# Patient Record
Sex: FEMALE | Race: WHITE | Employment: UNEMPLOYED | URBAN - METROPOLITAN AREA
[De-identification: names, ages, dates, MRNs, and addresses within clinical notes are randomized per-mention and may not be internally consistent; named-entity substitution may affect disease eponyms.]

---

## 2017-03-01 PROBLEM — K21.9 GASTROESOPHAGEAL REFLUX DISEASE, ESOPHAGITIS PRESENCE NOT SPECIFIED: Status: ACTIVE | Noted: 2017-03-01

## 2017-06-14 PROCEDURE — 87591 N.GONORRHOEAE DNA AMP PROB: CPT | Performed by: PHYSICIAN ASSISTANT

## 2017-06-14 PROCEDURE — 87510 GARDNER VAG DNA DIR PROBE: CPT | Performed by: PHYSICIAN ASSISTANT

## 2017-06-14 PROCEDURE — 87660 TRICHOMONAS VAGIN DIR PROBE: CPT | Performed by: PHYSICIAN ASSISTANT

## 2017-06-14 PROCEDURE — 87480 CANDIDA DNA DIR PROBE: CPT | Performed by: PHYSICIAN ASSISTANT

## 2017-06-14 PROCEDURE — 87491 CHLMYD TRACH DNA AMP PROBE: CPT | Performed by: PHYSICIAN ASSISTANT

## 2017-09-14 ENCOUNTER — NURSE TRIAGE (OUTPATIENT)
Dept: FAMILY MEDICINE CLINIC | Facility: CLINIC | Age: 37
End: 2017-09-14

## 2017-09-14 NOTE — TELEPHONE ENCOUNTER
Action Requested: Summary for Provider     []  Critical Lab, Recommendations Needed  [] Need Additional Advice  []   FYI    []   Need Orders  [] Need Medications Sent to Pharmacy  []  Other     SUMMARY: OV CREATED, headache, loss of taste    Pt states for

## 2017-09-18 ENCOUNTER — OFFICE VISIT (OUTPATIENT)
Dept: FAMILY MEDICINE CLINIC | Facility: CLINIC | Age: 37
End: 2017-09-18

## 2017-09-18 VITALS
HEIGHT: 60 IN | SYSTOLIC BLOOD PRESSURE: 140 MMHG | TEMPERATURE: 98 F | BODY MASS INDEX: 31.61 KG/M2 | HEART RATE: 73 BPM | RESPIRATION RATE: 16 BRPM | DIASTOLIC BLOOD PRESSURE: 88 MMHG | WEIGHT: 161 LBS

## 2017-09-18 DIAGNOSIS — K14.6 BURNING TONGUE SYNDROME: Primary | ICD-10-CM

## 2017-09-18 PROCEDURE — 99214 OFFICE O/P EST MOD 30 MIN: CPT | Performed by: FAMILY MEDICINE

## 2017-09-18 PROCEDURE — 99212 OFFICE O/P EST SF 10 MIN: CPT | Performed by: FAMILY MEDICINE

## 2017-09-18 RX ORDER — PREDNISONE 1 MG/1
2.5 TABLET ORAL DAILY
Qty: 7 TABLET | Refills: 0 | Status: SHIPPED | OUTPATIENT
Start: 2017-09-18 | End: 2018-02-05 | Stop reason: ALTCHOICE

## 2017-09-18 NOTE — PROGRESS NOTES
9/18/2017  5:07 PM    Peyman Vázquez is a 44year old female. Chief complaint(s): Patient presents with: Other: Patient present for c/o having only little taste to her tongue. Denies injuries, pain, or swelling.      HPI:     Peyman Vázquez primary co Gastrointestinal: Negative for nausea, vomiting, abdominal pain, diarrhea and constipation. Musculoskeletal: Negative for back pain. Neurological: Negative for seizures and headaches. Psychiatric/Behavioral: Negative for depressed mood.        PHYSI mg total) by mouth daily. RECOMMENDATIONS given include: Please, call our office with any questions or concerns. Notify Dr Louisa Torres or the [address] [place] [address] Scott Garnett if there is a deterioration or worsening of the medical condition.  Also, inform the doctor

## 2018-02-05 ENCOUNTER — OFFICE VISIT (OUTPATIENT)
Dept: FAMILY MEDICINE CLINIC | Facility: CLINIC | Age: 38
End: 2018-02-05

## 2018-02-05 VITALS
TEMPERATURE: 98 F | SYSTOLIC BLOOD PRESSURE: 132 MMHG | WEIGHT: 161 LBS | HEIGHT: 60 IN | BODY MASS INDEX: 31.61 KG/M2 | DIASTOLIC BLOOD PRESSURE: 85 MMHG | HEART RATE: 66 BPM

## 2018-02-05 DIAGNOSIS — R10.11 RUQ ABDOMINAL PAIN: Primary | ICD-10-CM

## 2018-02-05 PROCEDURE — 99212 OFFICE O/P EST SF 10 MIN: CPT | Performed by: FAMILY MEDICINE

## 2018-02-05 PROCEDURE — 99214 OFFICE O/P EST MOD 30 MIN: CPT | Performed by: FAMILY MEDICINE

## 2018-02-05 RX ORDER — OMEPRAZOLE 40 MG/1
40 CAPSULE, DELAYED RELEASE ORAL DAILY
Qty: 30 CAPSULE | Refills: 1 | Status: SHIPPED | OUTPATIENT
Start: 2018-02-05 | End: 2018-03-29

## 2018-02-05 NOTE — PROGRESS NOTES
2/5/2018  10:44 AM    Ke Spencer is a 44year old female. Chief complaint(s): Patient presents with:  Abdominal Pain: X3 days, Sx include pain, bloating, and constipation    HPI:     Ke Spencer primary complaint is regarding as above.      Damaris Negative for cough and wheezing. Cardiovascular: Negative for chest pain and palpitations. Gastrointestinal: Positive for abdominal pain and constipation. Negative for heartburn, nausea, vomiting and diarrhea.    Musculoskeletal: Negative for back pain found. -    ASSESSMENT/PLAN:   Assessment   Ruq abdominal pain  (primary encounter diagnosis)    MEDICATIONS:   Signed Prescriptions Disp Refills    Omeprazole 40 MG Oral Capsule Delayed Release 30 capsule 1      Sig: Take 1 capsule (40 mg total) by mouth

## 2018-02-15 ENCOUNTER — TELEPHONE (OUTPATIENT)
Dept: FAMILY MEDICINE CLINIC | Facility: CLINIC | Age: 38
End: 2018-02-15

## 2018-02-15 NOTE — TELEPHONE ENCOUNTER
Pt is calling to ask if results were received? pls advise. Thank you  She has an appt on 2/22 and wants to know if there in so the day she comes in thier ready.

## 2018-02-20 NOTE — TELEPHONE ENCOUNTER
Phone call made spouse answered, unable to leave message but spouse stated that he would let pt know that her results were in and to keep her appt for 02/22/18.

## 2018-02-22 ENCOUNTER — OFFICE VISIT (OUTPATIENT)
Dept: FAMILY MEDICINE CLINIC | Facility: CLINIC | Age: 38
End: 2018-02-22

## 2018-02-22 VITALS
BODY MASS INDEX: 31.41 KG/M2 | TEMPERATURE: 98 F | HEART RATE: 68 BPM | SYSTOLIC BLOOD PRESSURE: 143 MMHG | HEIGHT: 60 IN | DIASTOLIC BLOOD PRESSURE: 83 MMHG | WEIGHT: 160 LBS

## 2018-02-22 DIAGNOSIS — N20.0 RENAL STONES: ICD-10-CM

## 2018-02-22 DIAGNOSIS — R10.84 GENERALIZED ABDOMINAL PAIN: Primary | ICD-10-CM

## 2018-02-22 PROCEDURE — 99213 OFFICE O/P EST LOW 20 MIN: CPT | Performed by: FAMILY MEDICINE

## 2018-02-22 PROCEDURE — 99212 OFFICE O/P EST SF 10 MIN: CPT | Performed by: FAMILY MEDICINE

## 2018-02-22 RX ORDER — TAMSULOSIN HYDROCHLORIDE 0.4 MG/1
0.4 CAPSULE ORAL DAILY
Qty: 30 CAPSULE | Refills: 1 | Status: SHIPPED | OUTPATIENT
Start: 2018-02-22 | End: 2018-03-24

## 2018-02-22 NOTE — PROGRESS NOTES
2/22/2018  9:53 AM    Estefania Trivedi is a 44year old female. Chief complaint(s): Patient presents with:  Test Results    HPI:     Estefania Trivedi primary complaint is regarding abdominal pains.      Patient 44year old female presents with epigastric a Negative for chest pain and palpitations. Gastrointestinal: Positive for abdominal pain. Negative for nausea, vomiting, diarrhea and constipation. Musculoskeletal: Negative for back pain. Neurological: Negative for seizures and headaches.    Psychiatr prescriptions requested or ordered in this encounter    Imaging & Referrals:  None         Benny Kelsey MD

## 2018-03-28 ENCOUNTER — NURSE TRIAGE (OUTPATIENT)
Dept: OTHER | Age: 38
End: 2018-03-28

## 2018-03-28 NOTE — TELEPHONE ENCOUNTER
Action Requested: Summary for Provider     []  Critical Lab, Recommendations Needed  [] Need Additional Advice  []   FYI    []   Need Orders  [] Need Medications Sent to Pharmacy  []  Other     SUMMARY: Pt seeking recommendations for vaginal itching.     Pt

## 2018-03-29 ENCOUNTER — OFFICE VISIT (OUTPATIENT)
Dept: FAMILY MEDICINE CLINIC | Facility: CLINIC | Age: 38
End: 2018-03-29

## 2018-03-29 VITALS — DIASTOLIC BLOOD PRESSURE: 104 MMHG | SYSTOLIC BLOOD PRESSURE: 155 MMHG | HEART RATE: 65 BPM | HEIGHT: 60 IN

## 2018-03-29 DIAGNOSIS — N89.8 VAGINAL DISCHARGE: Primary | ICD-10-CM

## 2018-03-29 PROCEDURE — 99213 OFFICE O/P EST LOW 20 MIN: CPT | Performed by: NURSE PRACTITIONER

## 2018-03-29 NOTE — TELEPHONE ENCOUNTER
Reviewed Luc Iglesias. APN orders with pt in Serbian language and verbalized understanding. Sent email to Merissa PLAZAto double book appt 3/29/18 @ 2:45pn with Luc Iglesias. APN ADO for vaginal itching.    Please reply to pool: SYLVIA Shaffer

## 2018-03-29 NOTE — TELEPHONE ENCOUNTER
Pt asking if there is anything she can use for the vaginal itching until appointment. Pt states it is unbearable.

## 2018-03-29 NOTE — PROGRESS NOTES
HPI  Pt presents for vaginal d/c-started about 2 weeks ago as yellow d/c with slight odor, now white. Is very itchy-started using vagasil cream yesterday.  Due to scratching perineal area is very red and inflammed-was applying rubbing alcohol  Review of Sy place, and time. Coordination normal.   Skin: Skin is warm and dry. Psychiatric: She has a normal mood and affect. Her behavior is normal. Thought content normal.   Nursing note and vitals reviewed. Assessment:     1.  Vaginal discharge        Plan:

## 2018-03-29 NOTE — TELEPHONE ENCOUNTER
Phoenix Nettles of office. milabent see message below. Please schedule appt. I informed pt on her appt time.

## 2018-03-29 NOTE — PATIENT INSTRUCTIONS
Cómo prevenir la vaginitis     Use un jabón suave, sin perfume, al bañarse o ducharse para no irritar flores vagina. La vaginitis es lily irritación o infección de la vagina o la vulva (la abertura externa de la vagina).  La vaginitis puede deberse a bacter · Baje de peso si es necesario. Si tiene sobrepeso, el aire no puede circular mike alrededor de flores vagina. Eso aumenta flores riesgo de tener infecciones. · Alma Rosa ejercicio con regularidad. La actividad regular le ayudará a estar saludable.   · [place] antibiótico

## 2018-03-30 RX ORDER — METRONIDAZOLE 500 MG/1
500 TABLET ORAL 2 TIMES DAILY
Qty: 14 TABLET | Refills: 0 | Status: SHIPPED | OUTPATIENT
Start: 2018-03-30 | End: 2018-04-06

## 2018-03-31 LAB
GENITAL VAGINOSIS SCREEN: POSITIVE
TRICHOMONAS SCREEN: NEGATIVE

## 2018-04-02 ENCOUNTER — TELEPHONE (OUTPATIENT)
Dept: OTHER | Age: 38
End: 2018-04-02

## 2018-04-02 NOTE — TELEPHONE ENCOUNTER
Notes recorded by NASIR Hernandez on 4/2/2018 at 10:23 AM CDT  LMTCB  ------    Notes recorded by CARLO Samuels, FNP-C on 3/31/2018 at 10:40 AM CDT  Your vaginal culture is negative for trichomonas and yeast.  You should have picked up you

## 2018-04-07 ENCOUNTER — NURSE TRIAGE (OUTPATIENT)
Dept: OTHER | Age: 38
End: 2018-04-07

## 2018-04-07 NOTE — TELEPHONE ENCOUNTER
Action Requested: Summary for Provider     []  Critical Lab, Recommendations Needed  [] Need Additional Advice  []   FYI    []   Need Orders  [] Need Medications Sent to Pharmacy  []  Other     SUMMARY: patient advised Immediate care to further evaluate.  P

## 2018-04-12 ENCOUNTER — OFFICE VISIT (OUTPATIENT)
Dept: FAMILY MEDICINE CLINIC | Facility: CLINIC | Age: 38
End: 2018-04-12

## 2018-04-12 VITALS
WEIGHT: 162 LBS | DIASTOLIC BLOOD PRESSURE: 70 MMHG | SYSTOLIC BLOOD PRESSURE: 120 MMHG | HEIGHT: 60 IN | BODY MASS INDEX: 31.8 KG/M2 | HEART RATE: 72 BPM

## 2018-04-12 DIAGNOSIS — M54.12 CERVICAL RADICULOPATHY: Primary | ICD-10-CM

## 2018-04-12 PROCEDURE — 99212 OFFICE O/P EST SF 10 MIN: CPT | Performed by: FAMILY MEDICINE

## 2018-04-12 PROCEDURE — 99213 OFFICE O/P EST LOW 20 MIN: CPT | Performed by: FAMILY MEDICINE

## 2018-04-12 RX ORDER — TAMSULOSIN HYDROCHLORIDE 0.4 MG/1
CAPSULE ORAL DAILY
COMMUNITY
End: 2018-07-11 | Stop reason: ALTCHOICE

## 2018-04-12 RX ORDER — METHYLPREDNISOLONE 4 MG/1
TABLET ORAL
Qty: 1 KIT | Refills: 1 | Status: SHIPPED | OUTPATIENT
Start: 2018-04-12 | End: 2018-07-11 | Stop reason: ALTCHOICE

## 2018-04-12 NOTE — PROGRESS NOTES
Blood pressure 120/70, pulse 72, height 5' (1.524 m), weight 162 lb (73.5 kg), not currently breastfeeding. Patient presents today complaining of left arm pain from her neck for the past several weeks. She denies trauma.   She works in a [employer]

## 2018-07-10 ENCOUNTER — NURSE TRIAGE (OUTPATIENT)
Dept: OTHER | Age: 38
End: 2018-07-10

## 2018-07-10 NOTE — TELEPHONE ENCOUNTER
Action Requested: Summary for Provider     []  Critical Lab, Recommendations Needed  [] Need Additional Advice  []   FYI    []   Need Orders  [] Need Medications Sent to Pharmacy  []  Other     SUMMARY: Needs appt. Patient call disconnected.  Please give a

## 2018-07-11 ENCOUNTER — OFFICE VISIT (OUTPATIENT)
Dept: FAMILY MEDICINE CLINIC | Facility: CLINIC | Age: 38
End: 2018-07-11

## 2018-07-11 VITALS
TEMPERATURE: 98 F | DIASTOLIC BLOOD PRESSURE: 85 MMHG | SYSTOLIC BLOOD PRESSURE: 135 MMHG | BODY MASS INDEX: 32 KG/M2 | HEART RATE: 63 BPM | WEIGHT: 163 LBS

## 2018-07-11 DIAGNOSIS — N76.0 BACTERIAL VAGINOSIS: Primary | ICD-10-CM

## 2018-07-11 DIAGNOSIS — B96.89 BACTERIAL VAGINOSIS: Primary | ICD-10-CM

## 2018-07-11 DIAGNOSIS — K21.9 GASTROESOPHAGEAL REFLUX DISEASE WITHOUT ESOPHAGITIS: ICD-10-CM

## 2018-07-11 PROCEDURE — 99212 OFFICE O/P EST SF 10 MIN: CPT | Performed by: FAMILY MEDICINE

## 2018-07-11 PROCEDURE — 99214 OFFICE O/P EST MOD 30 MIN: CPT | Performed by: FAMILY MEDICINE

## 2018-07-11 RX ORDER — OMEPRAZOLE 40 MG/1
40 CAPSULE, DELAYED RELEASE ORAL DAILY
Qty: 30 CAPSULE | Refills: 1 | Status: SHIPPED | OUTPATIENT
Start: 2018-07-11 | End: 2019-07-06

## 2018-07-11 RX ORDER — METRONIDAZOLE 7.5 MG/G
1 GEL VAGINAL 2 TIMES DAILY
Qty: 1 TUBE | Refills: 1 | Status: SHIPPED | OUTPATIENT
Start: 2018-07-11 | End: 2018-07-16

## 2018-07-11 NOTE — PROGRESS NOTES
7/11/2018  12:06 PM    Meli Nash is a 44year old female.     Chief complaint(s): Patient presents with:  Vaginal Problem: c/o vaginitis, was seen by Junior Enriquez in March was prescribed Flagyl but she states that she never got a call nor did she take the me 10/13/2012      Medications (Active prior to today's visit):    Current Outpatient Prescriptions:  metRONIDAZOLE 0.75 % Vaginal Gel Place 1 Application vaginally 2 (two) times daily.  Disp: 1 Tube Rfl: 1   Omeprazole 40 MG Oral Capsule Delayed SCREEN   Result Value Ref Range   Genital vaginosis screen Positive (A) Negative   Trichomonas screen Negative Negative   Candida Screen Negative For Candida Negative for Candida       EKG / Spirometry : -     Radiology: No results found. -    ASSESSMENT/P follow-up appointments in  prn. Orders This Visit:  No orders of the defined types were placed in this encounter.       Meds This Visit:    Signed Prescriptions Disp Refills    metRONIDAZOLE 0.75 % Vaginal Gel 1 Tube 1      Sig: Place 1 Application va

## 2018-08-30 ENCOUNTER — OFFICE VISIT (OUTPATIENT)
Dept: FAMILY MEDICINE CLINIC | Facility: CLINIC | Age: 38
End: 2018-08-30

## 2018-08-30 VITALS
TEMPERATURE: 99 F | SYSTOLIC BLOOD PRESSURE: 126 MMHG | HEART RATE: 78 BPM | WEIGHT: 164 LBS | DIASTOLIC BLOOD PRESSURE: 85 MMHG | BODY MASS INDEX: 32 KG/M2

## 2018-08-30 DIAGNOSIS — B34.9 ACUTE VIRAL SYNDROME: Primary | ICD-10-CM

## 2018-08-30 PROCEDURE — 99213 OFFICE O/P EST LOW 20 MIN: CPT | Performed by: FAMILY MEDICINE

## 2018-08-30 PROCEDURE — 99212 OFFICE O/P EST SF 10 MIN: CPT | Performed by: FAMILY MEDICINE

## 2018-08-30 RX ORDER — CODEINE PHOSPHATE AND GUAIFENESIN 10; 100 MG/5ML; MG/5ML
5 SOLUTION ORAL EVERY 6 HOURS PRN
Qty: 120 ML | Refills: 1 | Status: SHIPPED | OUTPATIENT
Start: 2018-08-30 | End: 2018-12-06

## 2018-08-30 RX ORDER — ECHINACEA PURPUREA EXTRACT 125 MG
1 TABLET ORAL 4 TIMES DAILY PRN
Qty: 50 ML | Refills: 1 | Status: SHIPPED | OUTPATIENT
Start: 2018-08-30 | End: 2018-12-06

## 2018-08-30 RX ORDER — IBUPROFEN 600 MG/1
600 TABLET ORAL EVERY 8 HOURS PRN
Qty: 20 TABLET | Refills: 0 | Status: SHIPPED | OUTPATIENT
Start: 2018-08-30 | End: 2018-12-20

## 2018-08-30 NOTE — PROGRESS NOTES
2018 11:38 AM    Katya Amin, : 1980  Patient presents with:  [place]: X 2 days  Body ache and/or chills  Cough      HPI:     Katya Amin is a 43year old female who presents for evaluation of a chief complaint of fever, runny nose, LAPAROSCOPIC CHOLECYSTECTOMY  2013: OTHER SURGICAL HISTORY      Comment: EGD    Social History:     Social History  Social History   Marital status:   Spouse name: N/A    Years of education: N/A  Number of children: N/A     Occupational History  Non Plan:    Diagnosis:    ICD-10-CM    1. Acute viral syndrome B34.9        MEDICATIONS: •  Saline Nasal Spray (OCEAN NASAL SPRAY) 0.65 % Nasal Solution, 1 spray by Nasal route 4 (four) times daily as needed for congestion. , Disp: 50 mL, Rfl: 1  •  Ryan

## 2018-12-05 ENCOUNTER — NURSE TRIAGE (OUTPATIENT)
Dept: OTHER | Age: 38
End: 2018-12-05

## 2018-12-06 ENCOUNTER — TELEPHONE (OUTPATIENT)
Dept: OTHER | Age: 38
End: 2018-12-06

## 2018-12-06 ENCOUNTER — OFFICE VISIT (OUTPATIENT)
Dept: FAMILY MEDICINE CLINIC | Facility: CLINIC | Age: 38
End: 2018-12-06
Payer: COMMERCIAL

## 2018-12-06 VITALS
SYSTOLIC BLOOD PRESSURE: 120 MMHG | HEIGHT: 60 IN | TEMPERATURE: 99 F | BODY MASS INDEX: 32 KG/M2 | DIASTOLIC BLOOD PRESSURE: 84 MMHG | HEART RATE: 65 BPM

## 2018-12-06 DIAGNOSIS — R10.12 LEFT UPPER QUADRANT PAIN: Primary | ICD-10-CM

## 2018-12-06 PROBLEM — N89.8 VAGINAL DISCHARGE: Status: RESOLVED | Noted: 2018-03-29 | Resolved: 2018-12-06

## 2018-12-06 PROCEDURE — 81025 URINE PREGNANCY TEST: CPT | Performed by: PHYSICIAN ASSISTANT

## 2018-12-06 PROCEDURE — 99212 OFFICE O/P EST SF 10 MIN: CPT | Performed by: PHYSICIAN ASSISTANT

## 2018-12-06 PROCEDURE — 99213 OFFICE O/P EST LOW 20 MIN: CPT | Performed by: PHYSICIAN ASSISTANT

## 2018-12-06 NOTE — TELEPHONE ENCOUNTER
Using language line  Angela [id number]:  Patient stated is requesting an earlier appointment. She does not want to wait until the 20 th to see the doctor. Appointment made for today 12/6/18.

## 2018-12-06 NOTE — TELEPHONE ENCOUNTER
Patient calling stating she was advised by CT  that her insurance may not cover the CT because it was not ordered by a medical doctor. Patient requesting CT be ordered by medical doctor.  Patient put the woman in CT scheduling on the line with this

## 2018-12-06 NOTE — ASSESSMENT & PLAN NOTE
Order : CT scan of abdomen with contrast. Advise patient to proceed to ER if worsen. Patient voiced understanding. Patient states that she has no LUQ pain after CT scan scheduled.

## 2018-12-06 NOTE — TELEPHONE ENCOUNTER
Action Requested: Summary for Provider     []  Critical Lab, Recommendations Needed  [] Need Additional Advice  []   FYI    []   Need Orders  [] Need Medications Sent to Pharmacy  []  Other     SUMMARY: OV scheduled with Dr Baljit Tam 12/20/18 for bump under

## 2018-12-06 NOTE — PROGRESS NOTES
HPI:     HPI  43year-old female is here in the office complaining of a lump on LUQ for 2 years. Patient states that the pain is intermittently for 3 days. The pain is at a severity of 8/10 sometimes. Patient has tried Ibuprofen 600 mg with no reief.  Juan Substance and Sexual Activity      Alcohol use: No        Alcohol/week: 0.0 oz      Drug use: No      Sexual activity: Not on file    Other Topics      Concerns:        Not on file    Social History Narrative      Not on file      Review of Systems:   Revi Neurological: She is alert and oriented to person, place, and time. She has normal reflexes. Skin: Skin is intact. No rash noted. Psychiatric: She has a normal mood and affect.        Assessment and Plan[de-identified]     Problem List Items Addressed This Visit

## 2018-12-07 ENCOUNTER — TELEPHONE (OUTPATIENT)
Dept: FAMILY MEDICINE CLINIC | Facility: CLINIC | Age: 38
End: 2018-12-07

## 2018-12-07 NOTE — TELEPHONE ENCOUNTER
Zeferino Canas from [facility] Verification called in stating ramesh Lawrence needs to obtain pt's order authorization STAT for CT of the Abd [address]   Please advise

## 2018-12-20 ENCOUNTER — OFFICE VISIT (OUTPATIENT)
Dept: FAMILY MEDICINE CLINIC | Facility: CLINIC | Age: 38
End: 2018-12-20
Payer: COMMERCIAL

## 2018-12-20 VITALS
HEART RATE: 73 BPM | BODY MASS INDEX: 32 KG/M2 | WEIGHT: 163.63 LBS | TEMPERATURE: 98 F | SYSTOLIC BLOOD PRESSURE: 135 MMHG | DIASTOLIC BLOOD PRESSURE: 92 MMHG

## 2018-12-20 DIAGNOSIS — N20.0 RENAL LITHIASIS: Primary | ICD-10-CM

## 2018-12-20 PROCEDURE — 99213 OFFICE O/P EST LOW 20 MIN: CPT | Performed by: FAMILY MEDICINE

## 2018-12-20 PROCEDURE — 81003 URINALYSIS AUTO W/O SCOPE: CPT | Performed by: FAMILY MEDICINE

## 2018-12-20 PROCEDURE — 99212 OFFICE O/P EST SF 10 MIN: CPT | Performed by: FAMILY MEDICINE

## 2018-12-20 NOTE — PROGRESS NOTES
12/20/2018  11:09 AM    Tanmay Ron is a 43year old female. Chief complaint(s): Patient presents with:  Stones: Patient was seen at [address] [place] [address] for kidney stones    HPI:     Tanmay Ron primary complaint is regarding as above.      Patient 43 year ol Cardiovascular: Negative for chest pain and palpitations. Gastrointestinal: Negative for nausea, vomiting, abdominal pain, diarrhea and constipation. Musculoskeletal: Negative for back pain. Neurological: Negative for seizures and headaches.    Psyc 01/31/2019 Date       EKG / Spirometry : -     Radiology: No results found.      ASSESSMENT/PLAN:   Assessment   Renal lithiasis  (primary encounter diagnosis)    MEDICATIONS:   CPM        LABORATORY & ORDERS: Orders Placed This Encounter      SHARMILA Elias

## 2019-02-14 ENCOUNTER — OFFICE VISIT (OUTPATIENT)
Dept: FAMILY MEDICINE CLINIC | Facility: CLINIC | Age: 39
End: 2019-02-14
Payer: COMMERCIAL

## 2019-02-14 VITALS
TEMPERATURE: 99 F | HEART RATE: 77 BPM | WEIGHT: 168.38 LBS | BODY MASS INDEX: 33.06 KG/M2 | DIASTOLIC BLOOD PRESSURE: 89 MMHG | HEIGHT: 60 IN | SYSTOLIC BLOOD PRESSURE: 164 MMHG

## 2019-02-14 DIAGNOSIS — N20.0 RENAL LITHIASIS: ICD-10-CM

## 2019-02-14 DIAGNOSIS — Z30.017 ENCOUNTER FOR INITIAL PRESCRIPTION OF IMPLANTABLE SUBDERMAL CONTRACEPTIVE: Primary | ICD-10-CM

## 2019-02-14 PROCEDURE — 99212 OFFICE O/P EST SF 10 MIN: CPT | Performed by: FAMILY MEDICINE

## 2019-02-14 PROCEDURE — 99214 OFFICE O/P EST MOD 30 MIN: CPT | Performed by: FAMILY MEDICINE

## 2019-02-14 NOTE — PROGRESS NOTES
2/14/2019  2:02 PM    Jovan Dunlap is a 43year old female. Chief complaint(s): Patient presents with:  Contraception: Patient requesting birthcontrol nexplanon    HPI:     Jovan Dunlap primary complaint is regarding comntraception.      Jacy Lua Procedure Laterality Date   • LAPAROSCOPIC CHOLECYSTECTOMY  8/14   • OTHER SURGICAL HISTORY  2013    EGD      No family history on file.    Social History: Social History    Tobacco Use      Smoking status: Never Smoker      Smokeless tobacco: Never Used She has no cervical adenopathy. Skin: No rash noted. LABORATORY RESULTS:     EKG / Spirometry : -     Radiology: No results found.      ASSESSMENT/PLAN:   Assessment   Encounter for initial prescription of implantable subdermal contraceptive  (prima

## 2019-03-07 ENCOUNTER — TELEPHONE (OUTPATIENT)
Dept: FAMILY MEDICINE CLINIC | Facility: CLINIC | Age: 39
End: 2019-03-07

## 2019-03-07 NOTE — TELEPHONE ENCOUNTER
Pt states that she was seen in the office in February and was told to call back to see if we received the contraceptive that is inserted in the arm so that she can make an appt. Pt would like to know if it has been received. Pt is requesting return call in [facility]. Per pt this is her husbands number and it is ok to leave him a message.

## 2019-04-11 ENCOUNTER — OFFICE VISIT (OUTPATIENT)
Dept: SURGERY | Facility: CLINIC | Age: 39
End: 2019-04-11
Payer: COMMERCIAL

## 2019-04-11 VITALS
BODY MASS INDEX: 33 KG/M2 | DIASTOLIC BLOOD PRESSURE: 85 MMHG | HEART RATE: 71 BPM | TEMPERATURE: 98 F | SYSTOLIC BLOOD PRESSURE: 143 MMHG | WEIGHT: 168 LBS

## 2019-04-11 DIAGNOSIS — N20.0 KIDNEY STONES: Primary | ICD-10-CM

## 2019-04-11 PROCEDURE — 99244 OFF/OP CNSLTJ NEW/EST MOD 40: CPT | Performed by: UROLOGY

## 2019-04-11 PROCEDURE — 99212 OFFICE O/P EST SF 10 MIN: CPT | Performed by: UROLOGY

## 2019-04-11 NOTE — PROGRESS NOTES
SUBJECTIVE:  Primitivo Roach is a 43year old female who presents for a consultation at the request of, and a copy of this note will be sent to, Lee Ann Tenorio, for evaluation of  urinary stone. She states that the problem is unchanged.  Symptoms incl 32.81 kg/m²   She appears well, in no apparent distress. Alert and oriented times three, pleasant and cooperative.   CARDIOVASCULAR:normal apical impulse  RESPIRATORY: no chest wall deformities or tenderness  ABDOMEN: abdomen is soft without significant te

## 2019-04-17 ENCOUNTER — TELEPHONE (OUTPATIENT)
Dept: SURGERY | Facility: CLINIC | Age: 39
End: 2019-04-17

## 2019-04-17 NOTE — TELEPHONE ENCOUNTER
[facility] Insur Verifier calling to inform that prior Fatoumata Barfield is needed for pt. To get CT Abdomen test done on Sat. 4/20/19 -  CPT Code 85652.

## 2019-04-18 NOTE — TELEPHONE ENCOUNTER
[address] [place] [address] CPT code 53646 met criteria, but Reena Galvan location was not the facility insurance approves, was transferred to [employer], [employer] # [id number]. Representative Adrien Clements @ [facility] is approved; [address] [id number] # [id number], fax # [phone]. Fax order to Center for [address] Left message for insurance verification that test is approved, but facility is approved for [facility]. Next, utilized the language to line,  ID # [id number], to leave patient message to call back.

## 2019-04-20 ENCOUNTER — HOSPITAL ENCOUNTER (OUTPATIENT)
Dept: CT IMAGING | Age: 39
Discharge: HOME | End: 2019-04-20
Attending: UROLOGY
Payer: COMMERCIAL

## 2019-04-20 DIAGNOSIS — N20.0 KIDNEY STONES: ICD-10-CM

## 2019-04-20 PROCEDURE — 74176 CT ABD & PELVIS W/O CONTRAST: CPT | Performed by: UROLOGY

## 2019-04-26 NOTE — TELEPHONE ENCOUNTER
Prior authorization for Nexplanon 68MG SC IMPL completed w/[employer] on cover my meds Key: [id number]     Per CMM Available without authorization. I would recommend that pt will have to reach out to ins plan to see what contraceptive is actually covered. Please f/u with patient and notify here. Also, please route to pool Triage pool if further questions. Thank you.   Please respond to pool: EM FM ADO LPN/CMA      FYI Dr Yo Monday

## 2019-04-27 NOTE — TELEPHONE ENCOUNTER
According to [facility] it is available without authorization, if patient has any further questions regarding coverage she may contact her insurance plan.

## 2019-05-02 ENCOUNTER — TELEPHONE (OUTPATIENT)
Dept: SURGERY | Facility: CLINIC | Age: 39
End: 2019-05-02

## 2019-05-02 NOTE — TELEPHONE ENCOUNTER
Pt. Would like to get the results of her CT Scan, and find out plan of treatment. Pt. States that she gives the RN consent to leave her a detailed vm of her results, if she is not able to answer the call. Pt.  Also states that she gives the RN consent to sp

## 2019-05-02 NOTE — TELEPHONE ENCOUNTER
Patient's last office visit = 4/11 for left flank pain, CT stone protocol ordered. Please review.  Thank you

## 2019-05-03 NOTE — TELEPHONE ENCOUNTER
Called and spoke with her  Tena Sen. Patients CT shows bilateral renal calculi more numerous on the right measuring up to 6 mm.   Her scan is suggestive of medullary sponge kidney which I explained predisposes her to an increased risk of forming stones

## 2019-05-06 ENCOUNTER — OFFICE VISIT (OUTPATIENT)
Dept: SURGERY | Facility: CLINIC | Age: 39
End: 2019-05-06
Payer: COMMERCIAL

## 2019-05-06 VITALS
SYSTOLIC BLOOD PRESSURE: 122 MMHG | BODY MASS INDEX: 31.41 KG/M2 | DIASTOLIC BLOOD PRESSURE: 80 MMHG | WEIGHT: 160 LBS | HEIGHT: 60 IN | HEART RATE: 77 BPM | TEMPERATURE: 98 F

## 2019-05-06 DIAGNOSIS — N20.0 KIDNEY STONES: Primary | ICD-10-CM

## 2019-05-06 PROCEDURE — 99212 OFFICE O/P EST SF 10 MIN: CPT | Performed by: UROLOGY

## 2019-05-06 PROCEDURE — 99213 OFFICE O/P EST LOW 20 MIN: CPT | Performed by: UROLOGY

## 2019-05-06 NOTE — PROGRESS NOTES
Koby Garcia is a 43year old female. HPI:   Patient presents with:  Kidney Problem: Patient presents today to review imaging results. Other: Using language line.  Alvin Davila [id number]      28-year-old female in follow-up to a visit April agreed that she would follow-up on a as needed basis. Orders This Visit:  No orders of the defined types were placed in this encounter.       Meds This Visit:  Requested Prescriptions      No prescriptions requested or ordered in this encounter

## 2020-02-07 ENCOUNTER — NURSE TRIAGE (OUTPATIENT)
Dept: FAMILY MEDICINE CLINIC | Facility: CLINIC | Age: 40
End: 2020-02-07

## 2020-02-07 ENCOUNTER — OFFICE VISIT (OUTPATIENT)
Dept: FAMILY MEDICINE CLINIC | Facility: CLINIC | Age: 40
End: 2020-02-07
Payer: COMMERCIAL

## 2020-02-07 VITALS
DIASTOLIC BLOOD PRESSURE: 72 MMHG | HEIGHT: 60 IN | BODY MASS INDEX: 33.06 KG/M2 | TEMPERATURE: 98 F | HEART RATE: 64 BPM | WEIGHT: 168.38 LBS | SYSTOLIC BLOOD PRESSURE: 160 MMHG

## 2020-02-07 DIAGNOSIS — R03.0 BORDERLINE HIGH BLOOD PRESSURE: ICD-10-CM

## 2020-02-07 DIAGNOSIS — M43.6 TORTICOLLIS, ACUTE: Primary | ICD-10-CM

## 2020-02-07 PROCEDURE — 99213 OFFICE O/P EST LOW 20 MIN: CPT | Performed by: FAMILY MEDICINE

## 2020-02-07 RX ORDER — CYCLOBENZAPRINE HCL 10 MG
10 TABLET ORAL 3 TIMES DAILY
Qty: 30 TABLET | Refills: 1 | Status: SHIPPED | OUTPATIENT
Start: 2020-02-07 | End: 2020-02-27

## 2020-02-07 RX ORDER — IBUPROFEN 600 MG/1
600 TABLET ORAL EVERY 6 HOURS PRN
Qty: 60 TABLET | Refills: 0 | Status: SHIPPED | OUTPATIENT
Start: 2020-02-07 | End: 2020-04-13

## 2020-02-07 NOTE — TELEPHONE ENCOUNTER
Action Requested: Summary for Provider     []  Critical Lab, Recommendations Needed  [x] Need Additional Advice  []   FYI    []   Need Orders  [] Need Medications Sent to Pharmacy  []  Other     SUMMARY: Patient is requesting an appointment to see Dr. Amairani Esparza

## 2020-02-07 NOTE — PROGRESS NOTES
2/7/2020  10:17 AM    Carter Valentine Kayla is a 35year old female. Chief complaint(s): Patient presents with:  Neck Pain: right side neck pain x 1 week     HPI:     Carter Garza primary complaint is regarding as above.      Saad Rivera Constitutional: Negative for chills, fatigue and fever. Respiratory: Negative for shortness of breath. Cardiovascular: Negative for chest pain and palpitations. Musculoskeletal: Negative for back pain. Neurological: Negative for headaches.    [facility] if there is a deterioration or worsening of the medical condition. Also, inform the doctor with any new symptoms or medications' side effects. Low salt diet, weight loss. FOLLOW-UP: Schedule a follow-up visit in 1 week.          Orders This Visit

## 2020-02-07 NOTE — TELEPHONE ENCOUNTER
Per Dr. Levy Ser patient is to come to the office now to be evaluated. Patient made aware and voiced understanding and an appointment was scheduled for today 2/7/20.

## 2020-04-02 ENCOUNTER — NURSE TRIAGE (OUTPATIENT)
Dept: FAMILY MEDICINE CLINIC | Facility: CLINIC | Age: 40
End: 2020-04-02

## 2020-04-02 NOTE — TELEPHONE ENCOUNTER
With  [place] ID # [id number]: Have spoken w pt's  and states pt can be reached at other number [phone]. Spoke to pt and scheduled appointment 4/4/20 8 am with Dr. Brenda Mon in [address] [place] [address].

## 2020-04-02 NOTE — TELEPHONE ENCOUNTER
Action Requested: Summary for Provider     []  Critical Lab, Recommendations Needed  [x] Need Additional Advice  []   FYI    []   Need Orders  [] Need Medications Sent to Pharmacy  []  Other     SUMMARY: Patient requesting treatment recommendations for sym

## 2020-04-11 ENCOUNTER — TELEPHONE (OUTPATIENT)
Dept: FAMILY MEDICINE CLINIC | Facility: CLINIC | Age: 40
End: 2020-04-11

## 2020-04-11 NOTE — TELEPHONE ENCOUNTER
Phone call made spouse answered and was suppose to pass phone to patient but then call got d/c. Attempted to call a second time after a few mins no answer. Dary Vazquez may schedule an appt with him today if possible.

## 2020-04-11 NOTE — TELEPHONE ENCOUNTER
Called with the assistance of language line solutions (#). [phone]  Contacted patient to ask her if she would schedule an appointment with Dr Felipe Augustin today  She does not have transportation today and made an appt for Monday 4/13 at 1800

## 2020-04-11 NOTE — TELEPHONE ENCOUNTER
Dr Nevin Marcum for Dr Keyanna Mcrae, please advise. Patient called, asking to schedule OV with Dr Keyanna Mcrae. She has a headache, which she's had before, and was told she had elevated blood pressure at the time.  She had not been diagnosed with hypertension, and hunt[place]

## 2020-04-11 NOTE — TELEPHONE ENCOUNTER
Her blood pressure systolic was 954 in February. I am still working now. If she wants to come in right now we would be happy to see her. Dr. Gallito Burnett will be out next week. I do not think she should wait with the blood pressure that high.

## 2020-04-13 ENCOUNTER — OFFICE VISIT (OUTPATIENT)
Dept: FAMILY MEDICINE CLINIC | Facility: CLINIC | Age: 40
End: 2020-04-13
Payer: COMMERCIAL

## 2020-04-13 VITALS
HEART RATE: 76 BPM | BODY MASS INDEX: 33.77 KG/M2 | TEMPERATURE: 99 F | WEIGHT: 172 LBS | DIASTOLIC BLOOD PRESSURE: 98 MMHG | HEIGHT: 60 IN | SYSTOLIC BLOOD PRESSURE: 158 MMHG

## 2020-04-13 DIAGNOSIS — N20.0 RENAL LITHIASIS: ICD-10-CM

## 2020-04-13 DIAGNOSIS — I10 ESSENTIAL HYPERTENSION: ICD-10-CM

## 2020-04-13 DIAGNOSIS — R51.9 FRONTAL HEADACHE: Primary | ICD-10-CM

## 2020-04-13 DIAGNOSIS — Q61.5 MEDULLARY SPONGE KIDNEY OF BOTH KIDNEYS: ICD-10-CM

## 2020-04-13 PROCEDURE — 99215 OFFICE O/P EST HI 40 MIN: CPT | Performed by: FAMILY MEDICINE

## 2020-04-13 NOTE — PROGRESS NOTES
Patient ID: Doy Lombard is a 35year old female.     HPI  Patient presents with:  Blood Pressure: follow up      Sohan Brown RN   Registered Nurse      Telephone Encounter   Signed   Encounter Date:  4/11/2020               Signed Sharp pain in the forehead. He can last seconds or minutes. It comes and goes. It does not happen every day. She does states she has been quite stressed due to this cold cough with pandemic.   She has been working a little more than usual.  She however Cardiovascular: Negative for chest pain, palpitations and leg swelling. Gastrointestinal: Negative for nausea and vomiting. Neurological: Positive for headaches. Negative for light-headedness.            Medical History:      Past Medical History:   Melissa Abdominal: Normal appearance and bowel sounds are normal. There is    no tenderness. No abdominal bruits  There is no rigidity, no rebound, no guarding. Lymphadenopathy:     Patient has  no cervical adenopathy.    Neurological: Patient is alert and orient -     US KIDNEYS ([id number]); Future    Renal lithiasis  She has already seen urology for this in the past.  These have not been an issue. She is not having any flank pain. She is not passing any stones.       Referrals (if applicable)  Orders Placed This

## 2020-04-14 ENCOUNTER — LAB ENCOUNTER (OUTPATIENT)
Dept: LAB | Age: 40
End: 2020-04-14
Attending: FAMILY MEDICINE
Payer: COMMERCIAL

## 2020-04-14 DIAGNOSIS — Q61.5 MEDULLARY SPONGE KIDNEY OF BOTH KIDNEYS: ICD-10-CM

## 2020-04-14 DIAGNOSIS — I10 ESSENTIAL HYPERTENSION: ICD-10-CM

## 2020-04-14 PROCEDURE — 93005 ELECTROCARDIOGRAM TRACING: CPT

## 2020-04-14 PROCEDURE — 80053 COMPREHEN METABOLIC PANEL: CPT

## 2020-04-14 PROCEDURE — 36415 COLL VENOUS BLD VENIPUNCTURE: CPT

## 2020-04-14 PROCEDURE — 82088 ASSAY OF ALDOSTERONE: CPT

## 2020-04-14 PROCEDURE — 93010 ELECTROCARDIOGRAM REPORT: CPT | Performed by: FAMILY MEDICINE

## 2020-04-14 PROCEDURE — 84443 ASSAY THYROID STIM HORMONE: CPT

## 2020-04-14 PROCEDURE — 80061 LIPID PANEL: CPT

## 2020-04-14 PROCEDURE — 83970 ASSAY OF PARATHORMONE: CPT

## 2020-04-14 PROCEDURE — 81003 URINALYSIS AUTO W/O SCOPE: CPT

## 2020-04-14 PROCEDURE — 85025 COMPLETE CBC W/AUTO DIFF WBC: CPT

## 2020-04-15 ENCOUNTER — TELEPHONE (OUTPATIENT)
Dept: FAMILY MEDICINE CLINIC | Facility: CLINIC | Age: 40
End: 2020-04-15

## 2020-04-15 ENCOUNTER — TELEPHONE (OUTPATIENT)
Dept: OTHER | Age: 40
End: 2020-04-15

## 2020-04-15 NOTE — TELEPHONE ENCOUNTER
With  Xu Darnell id #[id number]   Advised patient of Dr Oumou Kidd note. Patient verbalized understanding.    Will call for ultrasound of kidney and for appointment with Dr. Alejandro Maguire   Informed Amlodipine 5 mg sent to [address] [place] [address] in [facility]

## 2020-04-15 NOTE — TELEPHONE ENCOUNTER
Patient states having a difficult time reaching office to schedule an appointment with Dr. Mian Abraham   Sending note to Nephrology staff to please reach out to patient and help schedule an appointment. Thank you so much  [facility] Nurse Triage.

## 2020-04-15 NOTE — TELEPHONE ENCOUNTER
LMTCB at mobile # and then with male who answered # listed as home # and assured this RN he will contact pt to call back today. Notes recorded by Anni Yoon RN on 4/14/2020 at 3:51 PM CDT  Language line #[phone] assisted with the phone call.  Left mes

## 2020-04-16 NOTE — TELEPHONE ENCOUNTER
Please schedule a video visit for the patient on Monday 4/20 and walk patient thru the video visit requirement including signing into my chart

## 2020-04-17 NOTE — TELEPHONE ENCOUNTER
Patient called using Amharic interpretor Chantal Purdy [phone]. Patient was not available but message was left with  about scheduling a visit Monday 4/20/2020 at 10 am.  Patient does not have My Chart. Patient will call back to confirm if [facility].

## 2020-04-21 NOTE — PROGRESS NOTES
Called with the assistance of language line solutions (#).  Tiffanie Jackson [phone]  LMTCB please transfer to triage RN

## 2020-04-22 NOTE — PROGRESS NOTES
With Language Line [phone], left messages to call back to both cell and home number. No MyChart. No PHONE RESPONSE LETTER mail today including the EKG results.

## 2020-04-23 ENCOUNTER — NURSE TRIAGE (OUTPATIENT)
Dept: FAMILY MEDICINE CLINIC | Facility: CLINIC | Age: 40
End: 2020-04-23

## 2020-04-23 NOTE — TELEPHONE ENCOUNTER
As long as no fevers, cough. Make sure she wears a mask anyway just for protection like she did last time. Inform her that I also enjoyed seeing her as a patient and was glad that we may have found a reason for her high blood pressure.

## 2020-04-23 NOTE — TELEPHONE ENCOUNTER
Action Requested: Summary for Provider     []  Critical Lab, Recommendations Needed  [] Need Additional Advice  []   FYI    []   Need Orders  [] Need Medications Sent to Pharmacy  []  Other     SUMMARY: Dr. Arya Peck,   Patient would like to see you at office

## 2020-04-24 ENCOUNTER — OFFICE VISIT (OUTPATIENT)
Dept: FAMILY MEDICINE CLINIC | Facility: CLINIC | Age: 40
End: 2020-04-24
Payer: COMMERCIAL

## 2020-04-24 ENCOUNTER — LAB ENCOUNTER (OUTPATIENT)
Dept: LAB | Age: 40
End: 2020-04-24
Attending: FAMILY MEDICINE
Payer: COMMERCIAL

## 2020-04-24 VITALS
HEIGHT: 60 IN | BODY MASS INDEX: 33.18 KG/M2 | SYSTOLIC BLOOD PRESSURE: 152 MMHG | TEMPERATURE: 98 F | DIASTOLIC BLOOD PRESSURE: 97 MMHG | WEIGHT: 169 LBS | HEART RATE: 66 BPM

## 2020-04-24 DIAGNOSIS — R19.7 DIARRHEA, UNSPECIFIED TYPE: Primary | ICD-10-CM

## 2020-04-24 DIAGNOSIS — R19.7 DIARRHEA, UNSPECIFIED TYPE: ICD-10-CM

## 2020-04-24 DIAGNOSIS — R01.1 NEWLY RECOGNIZED HEART MURMUR: ICD-10-CM

## 2020-04-24 DIAGNOSIS — Q61.5 MEDULLARY SPONGE KIDNEY OF BOTH KIDNEYS: ICD-10-CM

## 2020-04-24 DIAGNOSIS — I10 ESSENTIAL HYPERTENSION: ICD-10-CM

## 2020-04-24 PROCEDURE — 99214 OFFICE O/P EST MOD 30 MIN: CPT | Performed by: FAMILY MEDICINE

## 2020-04-24 PROCEDURE — 80048 BASIC METABOLIC PNL TOTAL CA: CPT

## 2020-04-24 PROCEDURE — 85025 COMPLETE CBC W/AUTO DIFF WBC: CPT

## 2020-04-24 PROCEDURE — 36415 COLL VENOUS BLD VENIPUNCTURE: CPT

## 2020-04-24 NOTE — PROGRESS NOTES
Patient ID: Mian Orta is a 35year old female.     HPI  Patient presents with:  Diarrhea: x 7 days  Stomach Pain: x 7 days    Telephone message from 4/23/20:  Victoriano Pinto RN   Registered Nurse      Telephone Encounter   Signed   Encounter nephrologist is a office visit or telephone video visit and then the ultrasound of the kidneys is something different.       She has been holding off on taking the Norvasc for 3 days as she thought that was causing the diarrhea but even without the Norvasc tablet (5 mg total) by mouth daily. 90 tablet 0     Allergies:No Known Allergies     Physical Exam:       Physical Exam   Physical Exam   Constitutional: Patient is oriented to person, place, and time. Patient appears well-developed and well-nourished.  No high.    ========================================================================    Start a BRAT diet which can help with diarrhea: Bananas, rice, applesauce and toast. Also can try Pedialyte or Gatorade as needed to stay hydrated.   Try 1 Imodium A.D.  in

## 2020-04-24 NOTE — TELEPHONE ENCOUNTER
Patient has scheduled appt with Dr. Monique Yi for today, 04/24/20, at 9:10 AM in [facility] office

## 2020-04-24 NOTE — PATIENT INSTRUCTIONS
Restart the Norvasc as blood pressure is too high.    ========================================================================    Start a BRAT diet which can help with diarrhea: Bananas, rice, applesauce and toast. Also can try Pedialyte or Gatorade as nee

## 2020-05-05 NOTE — TELEPHONE ENCOUNTER
AARTI to Dr. Daryl Sanders. Left another message with a patient to set up an appointment with Dr. Laverne Cabezas. Pt did not return our last attempt.

## 2020-05-05 NOTE — TELEPHONE ENCOUNTER
To my medical assistants, did she get the kidney ultrasound scheduled, the echocardiogram of her heart scheduled the telephone visit with the nephrologist scheduled? We gave her numbers for all 3 of these the last time she saw me. If she is having any questions or concerns then just set her up for a office visit with me Thursday or Friday of this week so we can discuss further.

## 2020-07-15 ENCOUNTER — TELEPHONE (OUTPATIENT)
Dept: FAMILY MEDICINE CLINIC | Facility: CLINIC | Age: 40
End: 2020-07-15

## 2020-07-15 DIAGNOSIS — I10 ESSENTIAL HYPERTENSION: ICD-10-CM

## 2020-07-15 RX ORDER — AMLODIPINE BESYLATE 5 MG/1
5 TABLET ORAL DAILY
Qty: 90 TABLET | Refills: 0 | Status: SHIPPED | OUTPATIENT
Start: 2020-07-15 | End: 2020-11-02

## 2020-07-15 NOTE — TELEPHONE ENCOUNTER
Current Outpatient Medications:   •  amLODIPine Besylate 5 MG Oral Tab, Take 1 tablet (5 mg total) by mouth daily. , Disp: 90 tablet, Rfl: 0

## 2020-07-28 NOTE — TELEPHONE ENCOUNTER
Epic chart reviewed. Patient has not had testing done. She should schedule a follow up with Dr. Tatianna Vitale. Please assist patient with an appointment .   [phone]

## 2020-07-29 NOTE — TELEPHONE ENCOUNTER
Called patient via language line Franchesca Hu; and she said just for a couple visit with Dr Dereje Aguayo she received a lot of bills already and she can not afford to do the testing right now, and she will call back to make the appointment,.

## 2020-11-02 DIAGNOSIS — I10 ESSENTIAL HYPERTENSION: ICD-10-CM

## 2020-11-02 RX ORDER — AMLODIPINE BESYLATE 5 MG/1
TABLET ORAL
Qty: 90 TABLET | Refills: 0 | Status: SHIPPED | OUTPATIENT
Start: 2020-11-02 | End: 2021-09-23

## 2021-02-03 DIAGNOSIS — I10 ESSENTIAL HYPERTENSION: ICD-10-CM

## 2021-02-09 RX ORDER — AMLODIPINE BESYLATE 5 MG/1
TABLET ORAL
Qty: 90 TABLET | Refills: 0 | OUTPATIENT
Start: 2021-02-09

## 2021-04-26 ENCOUNTER — TELEPHONE (OUTPATIENT)
Dept: FAMILY MEDICINE CLINIC | Facility: CLINIC | Age: 41
End: 2021-04-26

## 2021-04-26 NOTE — TELEPHONE ENCOUNTER
With  ID #[id number], Klever Harper. Patient states she was hospitalized in November for headaches and was told to follow up with her PCP. Patient states she does not have any symptoms, no headaches, only wants appointment with Alexander Bahena.

## 2021-05-14 ENCOUNTER — OFFICE VISIT (OUTPATIENT)
Dept: FAMILY MEDICINE CLINIC | Facility: CLINIC | Age: 41
End: 2021-05-14
Payer: COMMERCIAL

## 2021-05-14 VITALS
BODY MASS INDEX: 32.59 KG/M2 | HEIGHT: 60 IN | WEIGHT: 166 LBS | SYSTOLIC BLOOD PRESSURE: 159 MMHG | DIASTOLIC BLOOD PRESSURE: 93 MMHG | HEART RATE: 73 BPM

## 2021-05-14 DIAGNOSIS — G93.0 ARACHNOID CYST: Primary | ICD-10-CM

## 2021-05-14 DIAGNOSIS — I10 ESSENTIAL HYPERTENSION: ICD-10-CM

## 2021-05-14 PROCEDURE — 3077F SYST BP >= 140 MM HG: CPT | Performed by: FAMILY MEDICINE

## 2021-05-14 PROCEDURE — 3080F DIAST BP >= 90 MM HG: CPT | Performed by: FAMILY MEDICINE

## 2021-05-14 PROCEDURE — 3008F BODY MASS INDEX DOCD: CPT | Performed by: FAMILY MEDICINE

## 2021-05-14 PROCEDURE — 99213 OFFICE O/P EST LOW 20 MIN: CPT | Performed by: FAMILY MEDICINE
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
## 2021-07-02 ENCOUNTER — NURSE TRIAGE (OUTPATIENT)
Dept: FAMILY MEDICINE CLINIC | Facility: CLINIC | Age: 41
End: 2021-07-02

## 2021-07-02 NOTE — TELEPHONE ENCOUNTER
Action Requested: Summary for Provider     []  Critical Lab, Recommendations Needed  [] Need Additional Advice  []   FYI    []   Need Orders  [] Need Medications Sent to Pharmacy  []  Other     SUMMARY: Patient advised to go to ED or UC today, maria eugenia Alcantar

## 2021-09-23 DIAGNOSIS — I10 ESSENTIAL HYPERTENSION: ICD-10-CM

## 2021-09-23 RX ORDER — AMLODIPINE BESYLATE 5 MG/1
5 TABLET ORAL DAILY
Qty: 90 TABLET | Refills: 1 | Status: SHIPPED | OUTPATIENT
Start: 2021-09-23

## 2021-09-23 NOTE — TELEPHONE ENCOUNTER
Patient is requesting a refill for AMLODIPINE BESYLATE 5 MG Oral Tab. Please advise. Patient is out of medication.  Follow up is scheduled for 10/1

## 2021-10-15 ENCOUNTER — OFFICE VISIT (OUTPATIENT)
Dept: NEUROLOGY | Facility: CLINIC | Age: 41
End: 2021-10-15
Payer: COMMERCIAL

## 2021-10-15 VITALS
BODY MASS INDEX: 33.77 KG/M2 | DIASTOLIC BLOOD PRESSURE: 80 MMHG | SYSTOLIC BLOOD PRESSURE: 138 MMHG | HEART RATE: 80 BPM | HEIGHT: 60 IN | WEIGHT: 172 LBS

## 2021-10-15 DIAGNOSIS — G93.0 ARACHNOID CYST OF POSTERIOR CRANIAL FOSSA: Primary | ICD-10-CM

## 2021-10-15 DIAGNOSIS — G43.009 MIGRAINE WITHOUT AURA AND WITHOUT STATUS MIGRAINOSUS, NOT INTRACTABLE: ICD-10-CM

## 2021-10-15 PROCEDURE — 99204 OFFICE O/P NEW MOD 45 MIN: CPT | Performed by: OTHER

## 2021-10-15 PROCEDURE — 3079F DIAST BP 80-89 MM HG: CPT | Performed by: OTHER

## 2021-10-15 PROCEDURE — 3008F BODY MASS INDEX DOCD: CPT | Performed by: OTHER

## 2021-10-15 PROCEDURE — 3075F SYST BP GE 130 - 139MM HG: CPT | Performed by: OTHER

## 2021-10-15 RX ORDER — SUMATRIPTAN 100 MG/1
TABLET, FILM COATED ORAL
Qty: 9 TABLET | Refills: 5 | Status: SHIPPED | OUTPATIENT
Start: 2021-10-15 | End: 2021-10-15

## 2021-10-15 RX ORDER — SUMATRIPTAN 100 MG/1
TABLET, FILM COATED ORAL
Qty: 9 TABLET | Refills: 5 | Status: SHIPPED | OUTPATIENT
Start: 2021-10-15
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
## 2021-10-29 ENCOUNTER — OFFICE VISIT (OUTPATIENT)
Dept: FAMILY MEDICINE CLINIC | Facility: CLINIC | Age: 41
End: 2021-10-29
Payer: COMMERCIAL

## 2021-10-29 VITALS
BODY MASS INDEX: 32.59 KG/M2 | HEART RATE: 69 BPM | DIASTOLIC BLOOD PRESSURE: 84 MMHG | SYSTOLIC BLOOD PRESSURE: 139 MMHG | WEIGHT: 166 LBS | HEIGHT: 60 IN

## 2021-10-29 DIAGNOSIS — R10.12 LUQ PAIN: Primary | ICD-10-CM

## 2021-10-29 PROCEDURE — 3075F SYST BP GE 130 - 139MM HG: CPT | Performed by: FAMILY MEDICINE

## 2021-10-29 PROCEDURE — 3008F BODY MASS INDEX DOCD: CPT | Performed by: FAMILY MEDICINE

## 2021-10-29 PROCEDURE — 81003 URINALYSIS AUTO W/O SCOPE: CPT | Performed by: FAMILY MEDICINE

## 2021-10-29 PROCEDURE — 99213 OFFICE O/P EST LOW 20 MIN: CPT | Performed by: FAMILY MEDICINE

## 2021-10-29 PROCEDURE — 3079F DIAST BP 80-89 MM HG: CPT | Performed by: FAMILY MEDICINE

## 2021-10-29 RX ORDER — METRONIDAZOLE 250 MG/1
250 TABLET ORAL 3 TIMES DAILY
Qty: 30 TABLET | Refills: 0 | Status: SHIPPED | OUTPATIENT
Start: 2021-10-29 | End: 2021-11-08

## 2021-10-29 NOTE — PROGRESS NOTES
10/29/2021  10:24 AM    Lanre NICOLE Pillo Turpin is a 42year old female. Chief complaint(s): Patient presents with:  Pain: under rib cage left side x 1 month    HPI:     Jhoana Chiquito primary complaint is regarding as above.      Patient is a 42- Gastrointestinal: Positive for abdominal pain (LUQ). Negative for diarrhea, nausea and vomiting. Genitourinary: Negative for dysuria and hematuria. Musculoskeletal: Negative for back pain. Skin: Negative for rash.    Neurological: Negative for dizzi ASSESSMENT/PLAN:   Assessment   Luq pain  (primary encounter diagnosis)    DDx diverticulitis   MEDICATIONS:     Requested Prescriptions     Signed Prescriptions Disp Refills   • metRONIDAZOLE 250 MG Oral Tab 30 tablet 0     Sig: Take 1 tablet (250 mg

## 2022-03-18 ENCOUNTER — OFFICE VISIT (OUTPATIENT)
Dept: FAMILY MEDICINE CLINIC | Facility: CLINIC | Age: 42
End: 2022-03-18
Payer: COMMERCIAL

## 2022-03-18 VITALS
DIASTOLIC BLOOD PRESSURE: 86 MMHG | HEIGHT: 60 IN | SYSTOLIC BLOOD PRESSURE: 138 MMHG | WEIGHT: 166.38 LBS | HEART RATE: 90 BPM | BODY MASS INDEX: 32.67 KG/M2

## 2022-03-18 DIAGNOSIS — R10.12 LUQ PAIN: Primary | ICD-10-CM

## 2022-03-18 LAB
BILIRUBIN: NEGATIVE
GLUCOSE (URINE DIPSTICK): NEGATIVE MG/DL
KETONES (URINE DIPSTICK): NEGATIVE MG/DL
MULTISTIX LOT#: ABNORMAL NUMERIC
NITRITE, URINE: NEGATIVE
PH, URINE: 7 (ref 4.5–8)
PROTEIN (URINE DIPSTICK): 100 MG/DL
SPECIFIC GRAVITY: 1.02 (ref 1–1.03)

## 2022-03-18 PROCEDURE — 99213 OFFICE O/P EST LOW 20 MIN: CPT | Performed by: FAMILY MEDICINE

## 2022-03-18 PROCEDURE — 3008F BODY MASS INDEX DOCD: CPT | Performed by: FAMILY MEDICINE

## 2022-03-18 PROCEDURE — 3075F SYST BP GE 130 - 139MM HG: CPT | Performed by: FAMILY MEDICINE

## 2022-03-18 PROCEDURE — 3079F DIAST BP 80-89 MM HG: CPT | Performed by: FAMILY MEDICINE

## 2022-03-18 PROCEDURE — 81003 URINALYSIS AUTO W/O SCOPE: CPT | Performed by: FAMILY MEDICINE

## 2022-03-18 RX ORDER — SIMETHICONE 125 MG
250 TABLET,CHEWABLE ORAL EVERY 6 HOURS PRN
Qty: 60 TABLET | Refills: 1 | Status: SHIPPED | OUTPATIENT
Start: 2022-03-18

## 2022-08-16 ENCOUNTER — OFFICE VISIT (OUTPATIENT)
Dept: FAMILY MEDICINE CLINIC | Facility: CLINIC | Age: 42
End: 2022-08-16
Payer: COMMERCIAL

## 2022-08-16 VITALS
SYSTOLIC BLOOD PRESSURE: 156 MMHG | WEIGHT: 167.81 LBS | BODY MASS INDEX: 32.95 KG/M2 | HEIGHT: 60 IN | DIASTOLIC BLOOD PRESSURE: 91 MMHG | HEART RATE: 71 BPM

## 2022-08-16 DIAGNOSIS — Z00.00 PHYSICAL EXAM: Primary | ICD-10-CM

## 2022-08-16 DIAGNOSIS — I10 ESSENTIAL HYPERTENSION: ICD-10-CM

## 2022-08-16 PROCEDURE — 3080F DIAST BP >= 90 MM HG: CPT | Performed by: FAMILY MEDICINE

## 2022-08-16 PROCEDURE — 3008F BODY MASS INDEX DOCD: CPT | Performed by: FAMILY MEDICINE

## 2022-08-16 PROCEDURE — 90471 IMMUNIZATION ADMIN: CPT | Performed by: FAMILY MEDICINE

## 2022-08-16 PROCEDURE — 3077F SYST BP >= 140 MM HG: CPT | Performed by: FAMILY MEDICINE

## 2022-08-16 PROCEDURE — 99396 PREV VISIT EST AGE 40-64: CPT | Performed by: FAMILY MEDICINE

## 2022-08-16 PROCEDURE — 90715 TDAP VACCINE 7 YRS/> IM: CPT | Performed by: FAMILY MEDICINE

## 2022-08-16 RX ORDER — AMLODIPINE BESYLATE 5 MG/1
5 TABLET ORAL DAILY
Qty: 90 TABLET | Refills: 2 | Status: SHIPPED | OUTPATIENT
Start: 2022-08-16

## 2022-08-19 ENCOUNTER — LAB ENCOUNTER (OUTPATIENT)
Dept: LAB | Age: 42
End: 2022-08-19
Attending: FAMILY MEDICINE
Payer: COMMERCIAL

## 2022-08-19 ENCOUNTER — EKG ENCOUNTER (OUTPATIENT)
Dept: LAB | Age: 42
End: 2022-08-19
Attending: FAMILY MEDICINE
Payer: COMMERCIAL

## 2022-08-19 DIAGNOSIS — Z00.00 PHYSICAL EXAM: ICD-10-CM

## 2022-08-19 LAB
ALBUMIN SERPL-MCNC: 3.7 G/DL (ref 3.4–5)
ALBUMIN/GLOB SERPL: 0.9 {RATIO} (ref 1–2)
ALP LIVER SERPL-CCNC: 78 U/L
ALT SERPL-CCNC: 34 U/L
ANION GAP SERPL CALC-SCNC: 7 MMOL/L (ref 0–18)
AST SERPL-CCNC: 19 U/L (ref 15–37)
BASOPHILS # BLD AUTO: 0.04 X10(3) UL (ref 0–0.2)
BASOPHILS NFR BLD AUTO: 0.7 %
BILIRUB SERPL-MCNC: 0.3 MG/DL (ref 0.1–2)
BILIRUB UR QL: NEGATIVE
BUN BLD-MCNC: 19 MG/DL (ref 7–18)
BUN/CREAT SERPL: 29.2 (ref 10–20)
CALCIUM BLD-MCNC: 9.2 MG/DL (ref 8.5–10.1)
CHLORIDE SERPL-SCNC: 111 MMOL/L (ref 98–112)
CHOLEST SERPL-MCNC: 186 MG/DL (ref ?–200)
CLARITY UR: CLEAR
CO2 SERPL-SCNC: 23 MMOL/L (ref 21–32)
COLOR UR: YELLOW
CREAT BLD-MCNC: 0.65 MG/DL
DEPRECATED RDW RBC AUTO: 44.5 FL (ref 35.1–46.3)
EOSINOPHIL # BLD AUTO: 0.09 X10(3) UL (ref 0–0.7)
EOSINOPHIL NFR BLD AUTO: 1.7 %
ERYTHROCYTE [DISTWIDTH] IN BLOOD BY AUTOMATED COUNT: 12.9 % (ref 11–15)
EST. AVERAGE GLUCOSE BLD GHB EST-MCNC: 131 MG/DL (ref 68–126)
FASTING PATIENT LIPID ANSWER: YES
FASTING STATUS PATIENT QL REPORTED: YES
GFR SERPLBLD BASED ON 1.73 SQ M-ARVRAT: 113 ML/MIN/1.73M2 (ref 60–?)
GLOBULIN PLAS-MCNC: 4.2 G/DL (ref 2.8–4.4)
GLUCOSE BLD-MCNC: 117 MG/DL (ref 70–99)
GLUCOSE UR-MCNC: NEGATIVE MG/DL
HBA1C MFR BLD: 6.2 % (ref ?–5.7)
HCT VFR BLD AUTO: 41.1 %
HDLC SERPL-MCNC: 47 MG/DL (ref 40–59)
HGB BLD-MCNC: 13.7 G/DL
IMM GRANULOCYTES # BLD AUTO: 0 X10(3) UL (ref 0–1)
IMM GRANULOCYTES NFR BLD: 0 %
KETONES UR-MCNC: NEGATIVE MG/DL
LDLC SERPL CALC-MCNC: 118 MG/DL (ref ?–100)
LYMPHOCYTES # BLD AUTO: 1.64 X10(3) UL (ref 1–4)
LYMPHOCYTES NFR BLD AUTO: 30.7 %
MCH RBC QN AUTO: 31 PG (ref 26–34)
MCHC RBC AUTO-ENTMCNC: 33.3 G/DL (ref 31–37)
MCV RBC AUTO: 93 FL
MONOCYTES # BLD AUTO: 0.42 X10(3) UL (ref 0.1–1)
MONOCYTES NFR BLD AUTO: 7.9 %
NEUTROPHILS # BLD AUTO: 3.15 X10 (3) UL (ref 1.5–7.7)
NEUTROPHILS # BLD AUTO: 3.15 X10(3) UL (ref 1.5–7.7)
NEUTROPHILS NFR BLD AUTO: 59 %
NITRITE UR QL STRIP.AUTO: NEGATIVE
NONHDLC SERPL-MCNC: 139 MG/DL (ref ?–130)
OSMOLALITY SERPL CALC.SUM OF ELEC: 295 MOSM/KG (ref 275–295)
PH UR: 6 [PH] (ref 5–8)
PLATELET # BLD AUTO: 351 10(3)UL (ref 150–450)
POTASSIUM SERPL-SCNC: 3.4 MMOL/L (ref 3.5–5.1)
PROT SERPL-MCNC: 7.9 G/DL (ref 6.4–8.2)
PROT UR-MCNC: NEGATIVE MG/DL
RBC # BLD AUTO: 4.42 X10(6)UL
SODIUM SERPL-SCNC: 141 MMOL/L (ref 136–145)
SP GR UR STRIP: 1.02 (ref 1–1.03)
TRIGL SERPL-MCNC: 115 MG/DL (ref 30–149)
TSI SER-ACNC: 1.82 MIU/ML (ref 0.36–3.74)
UROBILINOGEN UR STRIP-ACNC: 0.2
VIT D+METAB SERPL-MCNC: 24.8 NG/ML (ref 30–100)
VLDLC SERPL CALC-MCNC: 20 MG/DL (ref 0–30)
WBC # BLD AUTO: 5.3 X10(3) UL (ref 4–11)

## 2022-08-19 PROCEDURE — 82306 VITAMIN D 25 HYDROXY: CPT

## 2022-08-19 PROCEDURE — 87086 URINE CULTURE/COLONY COUNT: CPT

## 2022-08-19 PROCEDURE — 83036 HEMOGLOBIN GLYCOSYLATED A1C: CPT

## 2022-08-19 PROCEDURE — 80061 LIPID PANEL: CPT

## 2022-08-19 PROCEDURE — 36415 COLL VENOUS BLD VENIPUNCTURE: CPT

## 2022-08-19 PROCEDURE — 80053 COMPREHEN METABOLIC PANEL: CPT

## 2022-08-19 PROCEDURE — 85025 COMPLETE CBC W/AUTO DIFF WBC: CPT

## 2022-08-19 PROCEDURE — 93005 ELECTROCARDIOGRAM TRACING: CPT

## 2022-08-19 PROCEDURE — 84443 ASSAY THYROID STIM HORMONE: CPT

## 2022-08-19 PROCEDURE — 93010 ELECTROCARDIOGRAM REPORT: CPT | Performed by: FAMILY MEDICINE

## 2022-08-19 PROCEDURE — 81015 MICROSCOPIC EXAM OF URINE: CPT

## 2022-08-19 PROCEDURE — 81001 URINALYSIS AUTO W/SCOPE: CPT

## 2022-08-20 RX ORDER — ERGOCALCIFEROL 1.25 MG/1
50000 CAPSULE ORAL WEEKLY
Qty: 12 CAPSULE | Refills: 4 | Status: SHIPPED | OUTPATIENT
Start: 2022-08-20 | End: 2022-09-19

## 2023-02-22 ENCOUNTER — NURSE TRIAGE (OUTPATIENT)
Dept: FAMILY MEDICINE CLINIC | Facility: CLINIC | Age: 43
End: 2023-02-22

## 2023-02-24 ENCOUNTER — OFFICE VISIT (OUTPATIENT)
Dept: FAMILY MEDICINE CLINIC | Facility: CLINIC | Age: 43
End: 2023-02-24

## 2023-02-24 VITALS
SYSTOLIC BLOOD PRESSURE: 138 MMHG | DIASTOLIC BLOOD PRESSURE: 84 MMHG | HEIGHT: 60 IN | WEIGHT: 150 LBS | HEART RATE: 71 BPM | RESPIRATION RATE: 18 BRPM | BODY MASS INDEX: 29.45 KG/M2 | OXYGEN SATURATION: 98 % | TEMPERATURE: 98 F

## 2023-02-24 DIAGNOSIS — G43.009 MIGRAINE WITHOUT AURA AND WITHOUT STATUS MIGRAINOSUS, NOT INTRACTABLE: Primary | ICD-10-CM

## 2023-02-24 DIAGNOSIS — M54.2 NECK PAIN: ICD-10-CM

## 2023-02-24 PROBLEM — N23 RENAL COLIC: Status: ACTIVE | Noted: 2018-11-18

## 2023-02-24 PROBLEM — N39.0 URINARY TRACT INFECTION WITH HEMATURIA: Status: ACTIVE | Noted: 2017-03-10

## 2023-02-24 PROBLEM — N20.1 URETEROLITHIASIS: Status: ACTIVE | Noted: 2018-11-18

## 2023-02-24 PROBLEM — R11.2 NAUSEA, VOMITING, AND DIARRHEA: Status: ACTIVE | Noted: 2017-03-10

## 2023-02-24 PROBLEM — R19.7 NAUSEA, VOMITING, AND DIARRHEA: Status: ACTIVE | Noted: 2017-03-10

## 2023-02-24 PROBLEM — R31.9 URINARY TRACT INFECTION WITH HEMATURIA: Status: ACTIVE | Noted: 2017-03-10

## 2023-02-24 PROCEDURE — 3079F DIAST BP 80-89 MM HG: CPT

## 2023-02-24 PROCEDURE — 3075F SYST BP GE 130 - 139MM HG: CPT

## 2023-02-24 PROCEDURE — 99213 OFFICE O/P EST LOW 20 MIN: CPT

## 2023-02-24 PROCEDURE — 3008F BODY MASS INDEX DOCD: CPT

## 2023-02-24 RX ORDER — CYCLOBENZAPRINE HCL 5 MG
5 TABLET ORAL NIGHTLY
Qty: 5 TABLET | Refills: 0 | Status: SHIPPED | OUTPATIENT
Start: 2023-02-24

## 2023-02-24 RX ORDER — ACETAMINOPHEN AND CODEINE PHOSPHATE 300; 30 MG/1; MG/1
1-2 TABLET ORAL
COMMUNITY
Start: 2023-01-13 | End: 2023-02-24 | Stop reason: ALTCHOICE

## 2023-02-24 RX ORDER — ONDANSETRON 4 MG/1
4 TABLET, FILM COATED ORAL EVERY 8 HOURS PRN
Qty: 15 TABLET | Refills: 0 | Status: SHIPPED | OUTPATIENT
Start: 2023-02-24

## 2023-02-24 RX ORDER — SUMATRIPTAN 100 MG/1
TABLET, FILM COATED ORAL
Qty: 14 TABLET | Refills: 1 | Status: SHIPPED | OUTPATIENT
Start: 2023-02-24

## 2023-02-24 RX ORDER — PENICILLIN V POTASSIUM 500 MG/1
TABLET ORAL
COMMUNITY
Start: 2023-01-13 | End: 2023-02-24 | Stop reason: ALTCHOICE

## 2023-02-28 ENCOUNTER — TELEPHONE (OUTPATIENT)
Dept: NEUROLOGY | Facility: CLINIC | Age: 43
End: 2023-02-28

## 2023-02-28 NOTE — TELEPHONE ENCOUNTER
Patient called to make appointment for a follow up and also said that at her last appointment Dr. Ronan Yang told her that he was going to prescribe her (2) medications for her headaches but she has never received anything.
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
## 2023-03-01 NOTE — TELEPHONE ENCOUNTER
Left message for patient to call me back. LOV in 2021 and Dr. Jermaine Stubbs had only prescribed her sumatriptan. Called for clarification.

## 2023-03-01 NOTE — TELEPHONE ENCOUNTER
Patient called back- she did not remember which other medication Dr. Marc Hillman had discussed with her but let me know the other medication never went to pharmacy and she tried to call but no one spoke Omani. I let her know I see that Dr. Marc Hillman had sent the medications to the [facility] in [place]. She verified this was correct. Let her know its a good idea until she waits to see Dr. Marc Hillman so he can do a physical examination and discuss medication options. Her PCP provided her with sumatriptan which she says was helpful for her in the meantime. She thanked me for call.

## 2023-03-09 ENCOUNTER — NURSE TRIAGE (OUTPATIENT)
Dept: FAMILY MEDICINE CLINIC | Facility: CLINIC | Age: 43
End: 2023-03-09

## 2023-03-09 DIAGNOSIS — I10 ESSENTIAL HYPERTENSION: ICD-10-CM

## 2023-03-09 RX ORDER — AMLODIPINE BESYLATE 5 MG/1
5 TABLET ORAL DAILY
Qty: 90 TABLET | Refills: 3 | Status: CANCELLED
Start: 2023-03-09

## 2023-03-09 RX ORDER — CHLORTHALIDONE 25 MG/1
25 TABLET ORAL DAILY
Qty: 30 TABLET | Refills: 3 | Status: SHIPPED | OUTPATIENT
Start: 2023-03-09

## 2023-04-19 NOTE — TELEPHONE ENCOUNTER
With  Black Torres #[id number], Advised patient of Dr. Martin Brown note. Patient should still also have one more refill on the amlodipine 5mg at [address]. Patient verbalized understanding. Detail Level: Detailed

## 2023-05-02 ENCOUNTER — OFFICE VISIT (OUTPATIENT)
Dept: FAMILY MEDICINE CLINIC | Facility: CLINIC | Age: 43
End: 2023-05-02

## 2023-05-02 VITALS
HEIGHT: 60 IN | WEIGHT: 161.19 LBS | SYSTOLIC BLOOD PRESSURE: 117 MMHG | BODY MASS INDEX: 31.64 KG/M2 | DIASTOLIC BLOOD PRESSURE: 78 MMHG | HEART RATE: 81 BPM

## 2023-05-02 DIAGNOSIS — I10 ESSENTIAL HYPERTENSION: ICD-10-CM

## 2023-05-02 DIAGNOSIS — K59.01 SLOW TRANSIT CONSTIPATION: ICD-10-CM

## 2023-05-02 DIAGNOSIS — R10.12 LUQ PAIN: Primary | ICD-10-CM

## 2023-05-02 PROCEDURE — 3074F SYST BP LT 130 MM HG: CPT | Performed by: FAMILY MEDICINE

## 2023-05-02 PROCEDURE — 3008F BODY MASS INDEX DOCD: CPT | Performed by: FAMILY MEDICINE

## 2023-05-02 PROCEDURE — 99213 OFFICE O/P EST LOW 20 MIN: CPT | Performed by: FAMILY MEDICINE

## 2023-05-02 PROCEDURE — 3078F DIAST BP <80 MM HG: CPT | Performed by: FAMILY MEDICINE

## 2023-05-02 RX ORDER — AMLODIPINE BESYLATE 5 MG/1
5 TABLET ORAL DAILY
Qty: 90 TABLET | Refills: 2 | Status: SHIPPED | OUTPATIENT
Start: 2023-05-02

## 2023-05-02 RX ORDER — SIMETHICONE 125 MG
250 TABLET,CHEWABLE ORAL EVERY 6 HOURS PRN
Qty: 60 TABLET | Refills: 1 | Status: SHIPPED | OUTPATIENT
Start: 2023-05-02

## 2023-05-02 RX ORDER — CHLORTHALIDONE 25 MG/1
25 TABLET ORAL DAILY
Qty: 30 TABLET | Refills: 2 | Status: SHIPPED | OUTPATIENT
Start: 2023-05-02

## 2023-07-19 RX ORDER — CHLORTHALIDONE 25 MG/1
25 TABLET ORAL DAILY
Qty: 90 TABLET | Refills: 1 | Status: SHIPPED | OUTPATIENT
Start: 2023-07-19

## 2023-07-19 NOTE — TELEPHONE ENCOUNTER
Please review. Protocol failed or has no protocol. Requested Prescriptions   Pending Prescriptions Disp Refills    CHLORTHALIDONE 25 MG Oral Tab [Pharmacy Med Name: CHLORTHALIDONE 25MG TABLETS] 90 tablet 0     Sig: TAKE 1 TABLET(25 MG) BY MOUTH DAILY       Hypertensive Medications Protocol Failed - 7/18/2023  3:40 AM        Failed - CMP or BMP in past 6 months     No results found for this or any previous visit (from the past 4392 hour(s)).             Passed - In person appointment in the past 12 or next 3 months     Recent Outpatient Visits              2 months ago LUQ pain    [facility], [facility], Madi Hendrix MD    Office Visit    4 months ago Migraine without aura and without status migrainosus, not intractable    [facility], [address] [facility] [address]    Office Visit    11 months ago Physical exam    [id number] Fantasma Ty MD    Office Visit    1 year ago LUQ pain    [id number] Fantasma Ty MD    Office Visit    1 year ago LUQ pain    [id number] Fantasma Ty MD    Office Visit                      Passed - Last BP reading less than 140/90     BP Readings from Last 1 Encounters:  05/02/23 : 117/78              Passed - In person appointment or virtual visit in the past 6 months     Recent Outpatient Visits              2 months ago LUQ pain    [address], [place], Fantasma Velázquez MD    Office Visit    4 months ago Migraine without aura and without status migrainosus, not intractable    [facility] [address] [facility] [address] [place]    Office Visit    11 months ago Physical exam    [id number] Fantasma Ty MD    Office Visit    1 year ago LUQ pain    [facility], [place], Madi Perea MD    Office Visit    1 year ago LUQ pain    [address], [place], Jose G Estrada MD    Office Visit                      Passed - [facility] or [facility] > 50     GFR Evaluation  EGFRCR: 113 , resulted on 8/19/2022               Recent Outpatient Visits              2 months ago LUQ pain    Rafita Turner MD    Office Visit    4 months ago Migraine without aura and without status migrainosus, not intractable    [facility], [address] [place] [address] [address] [place]    Office Visit    11 months ago Physical exam    [address] [facility], Jose G Estrada MD    Office Visit    1 year ago LUQ pain    [address] [facility], Jose G Estrada MD    Office Visit    1 year ago LUQ pain    [address], [place], Jose G Estrada MD    Office Visit

## 2023-08-10 ENCOUNTER — NURSE TRIAGE (OUTPATIENT)
Dept: FAMILY MEDICINE CLINIC | Facility: CLINIC | Age: 43
End: 2023-08-10

## 2023-08-10 ENCOUNTER — HOSPITAL ENCOUNTER (OUTPATIENT)
Age: 43
Discharge: HOME | End: 2023-08-10
Payer: COMMERCIAL

## 2023-08-10 VITALS
HEART RATE: 69 BPM | RESPIRATION RATE: 18 BRPM | SYSTOLIC BLOOD PRESSURE: 122 MMHG | DIASTOLIC BLOOD PRESSURE: 72 MMHG | OXYGEN SATURATION: 100 % | TEMPERATURE: 97 F

## 2023-08-10 DIAGNOSIS — M54.12 CERVICAL RADICULOPATHY: Primary | ICD-10-CM

## 2023-08-10 PROCEDURE — 99213 OFFICE O/P EST LOW 20 MIN: CPT | Performed by: NURSE PRACTITIONER

## 2023-08-10 RX ORDER — METHYLPREDNISOLONE 4 MG/1
TABLET ORAL
Qty: 1 EACH | Refills: 0 | Status: SHIPPED | OUTPATIENT
Start: 2023-08-10

## 2023-08-10 RX ORDER — CYCLOBENZAPRINE HCL 10 MG
10 TABLET ORAL 3 TIMES DAILY PRN
Qty: 10 TABLET | Refills: 0 | Status: SHIPPED | OUTPATIENT
Start: 2023-08-10 | End: 2023-08-17

## 2023-08-10 NOTE — ED INITIAL ASSESSMENT (HPI)
Pt here w c/o R sided neck pain to upper arm, worse when trying to sleep and when she's at work at the [employer]. Denies injury.

## 2023-08-10 NOTE — TELEPHONE ENCOUNTER
Action Requested: Summary for Provider     []  Critical Lab, Recommendations Needed  [] Need Additional Advice  []   FYI    []   Need Orders  [] Need Medications Sent to Pharmacy  []  Other     SUMMARY: Per protocol disposition advised Go to ED/ICC now. Patient plans to go to [facility], address provided. Reason for call: Acute (Right arm pain)  Onset: 3-4 days ago    Spoke to patient with assistance from [email or web address] Christophe Done ID #[id number] (Name and  of patient verified). Reports right arm pain that started 3-4 days ago  Not wrist or hand pain, it starts in the elbow and goes up are toward back, but does not reach back. Patient reports swelling from her elbow up to her breast.  Did have mouth sores - 2 days ago, but they have since resolved.   Denies: chest pain, injury, redness, and protocol questions marked with \"no\"    Reason for Disposition   Entire arm is swollen    Protocols used: Elbow Pain-A-OH

## 2023-09-01 ENCOUNTER — NURSE TRIAGE (OUTPATIENT)
Dept: FAMILY MEDICINE CLINIC | Facility: CLINIC | Age: 43
End: 2023-09-01

## 2023-09-14 ENCOUNTER — LAB ENCOUNTER (OUTPATIENT)
Dept: LAB | Age: 43
End: 2023-09-14
Attending: FAMILY MEDICINE
Payer: COMMERCIAL

## 2023-09-14 ENCOUNTER — OFFICE VISIT (OUTPATIENT)
Dept: FAMILY MEDICINE CLINIC | Facility: CLINIC | Age: 43
End: 2023-09-14

## 2023-09-14 VITALS
WEIGHT: 165.19 LBS | HEIGHT: 60 IN | TEMPERATURE: 99 F | BODY MASS INDEX: 32.43 KG/M2 | DIASTOLIC BLOOD PRESSURE: 88 MMHG | SYSTOLIC BLOOD PRESSURE: 135 MMHG | HEART RATE: 65 BPM

## 2023-09-14 DIAGNOSIS — R10.84 GENERALIZED ABDOMINAL PAIN: Primary | ICD-10-CM

## 2023-09-14 DIAGNOSIS — R10.84 GENERALIZED ABDOMINAL PAIN: ICD-10-CM

## 2023-09-14 PROCEDURE — 3079F DIAST BP 80-89 MM HG: CPT | Performed by: FAMILY MEDICINE

## 2023-09-14 PROCEDURE — 83013 H PYLORI (C-13) BREATH: CPT

## 2023-09-14 PROCEDURE — 3075F SYST BP GE 130 - 139MM HG: CPT | Performed by: FAMILY MEDICINE

## 2023-09-14 PROCEDURE — 3008F BODY MASS INDEX DOCD: CPT | Performed by: FAMILY MEDICINE

## 2023-09-14 PROCEDURE — 99213 OFFICE O/P EST LOW 20 MIN: CPT | Performed by: FAMILY MEDICINE

## 2023-09-14 RX ORDER — OMEPRAZOLE 40 MG/1
40 CAPSULE, DELAYED RELEASE ORAL DAILY
Qty: 30 CAPSULE | Refills: 3 | Status: SHIPPED | OUTPATIENT
Start: 2023-09-14 | End: 2024-09-08

## 2023-09-14 RX ORDER — SIMETHICONE 125 MG
250 TABLET,CHEWABLE ORAL EVERY 6 HOURS PRN
Qty: 60 TABLET | Refills: 1 | Status: SHIPPED | OUTPATIENT
Start: 2023-09-14

## 2023-09-16 LAB — H PYLORI BREATH TEST: NEGATIVE

## 2023-09-19 ENCOUNTER — LAB ENCOUNTER (OUTPATIENT)
Dept: LAB | Age: 43
End: 2023-09-19
Attending: FAMILY MEDICINE
Payer: COMMERCIAL

## 2023-09-19 LAB
CRYPTOSP AG STL QL IA: NEGATIVE
G LAMBLIA AG STL QL IA: NEGATIVE

## 2023-12-20 ENCOUNTER — OFFICE VISIT (OUTPATIENT)
Dept: FAMILY MEDICINE CLINIC | Facility: CLINIC | Age: 43
End: 2023-12-20

## 2023-12-20 VITALS
SYSTOLIC BLOOD PRESSURE: 140 MMHG | HEART RATE: 61 BPM | HEIGHT: 60 IN | BODY MASS INDEX: 32.39 KG/M2 | WEIGHT: 165 LBS | DIASTOLIC BLOOD PRESSURE: 90 MMHG

## 2023-12-20 DIAGNOSIS — M54.12 CERVICAL RADICULOPATHY: ICD-10-CM

## 2023-12-20 DIAGNOSIS — I10 ESSENTIAL HYPERTENSION: ICD-10-CM

## 2023-12-20 DIAGNOSIS — M54.2 NECK PAIN: Primary | ICD-10-CM

## 2023-12-20 PROCEDURE — 3077F SYST BP >= 140 MM HG: CPT | Performed by: NURSE PRACTITIONER

## 2023-12-20 PROCEDURE — 3080F DIAST BP >= 90 MM HG: CPT | Performed by: NURSE PRACTITIONER

## 2023-12-20 PROCEDURE — 99214 OFFICE O/P EST MOD 30 MIN: CPT | Performed by: NURSE PRACTITIONER

## 2023-12-20 PROCEDURE — 3008F BODY MASS INDEX DOCD: CPT | Performed by: NURSE PRACTITIONER

## 2023-12-20 RX ORDER — CYCLOBENZAPRINE HCL 5 MG
5 TABLET ORAL 3 TIMES DAILY PRN
Qty: 30 TABLET | Refills: 0 | Status: SHIPPED | OUTPATIENT
Start: 2023-12-20

## 2023-12-20 RX ORDER — CHLORTHALIDONE 25 MG/1
25 TABLET ORAL DAILY
Qty: 30 TABLET | Refills: 0 | Status: SHIPPED | OUTPATIENT
Start: 2023-12-20

## 2023-12-20 RX ORDER — METHYLPREDNISOLONE 4 MG/1
TABLET ORAL
Qty: 21 EACH | Refills: 0 | Status: SHIPPED | OUTPATIENT
Start: 2023-12-20

## 2023-12-21 ENCOUNTER — HOSPITAL ENCOUNTER (OUTPATIENT)
Dept: GENERAL RADIOLOGY | Age: 43
Discharge: HOME | End: 2023-12-21
Attending: NURSE PRACTITIONER
Payer: COMMERCIAL

## 2023-12-21 DIAGNOSIS — M54.12 CERVICAL RADICULOPATHY: ICD-10-CM

## 2023-12-21 DIAGNOSIS — M54.2 NECK PAIN: ICD-10-CM

## 2023-12-21 PROCEDURE — 72050 X-RAY EXAM NECK SPINE 4/5VWS: CPT | Performed by: NURSE PRACTITIONER

## 2024-03-11 ENCOUNTER — OFFICE VISIT (OUTPATIENT)
Dept: FAMILY MEDICINE CLINIC | Facility: CLINIC | Age: 44
End: 2024-03-11

## 2024-03-11 VITALS
WEIGHT: 170 LBS | HEART RATE: 73 BPM | DIASTOLIC BLOOD PRESSURE: 81 MMHG | SYSTOLIC BLOOD PRESSURE: 128 MMHG | BODY MASS INDEX: 33.38 KG/M2 | HEIGHT: 60 IN

## 2024-03-11 DIAGNOSIS — N76.0 ACUTE VAGINITIS: ICD-10-CM

## 2024-03-11 DIAGNOSIS — Z12.4 CERVICAL CANCER SCREENING: Primary | ICD-10-CM

## 2024-03-11 RX ORDER — FLUCONAZOLE 150 MG/1
150 TABLET ORAL ONCE
Qty: 1 TABLET | Refills: 0 | Status: SHIPPED | OUTPATIENT
Start: 2024-03-11 | End: 2024-03-11

## 2024-03-11 NOTE — PROGRESS NOTES
Jacy Hernandez is a 43 year old female.  HPI:   Patient is here for few weeks history of vaginal discomfort, foul-smelling, she denies any urinary complaints, no significant vaginal discharge noted  Current Outpatient Medications   Medication Sig Dispense Refill    fluconazole 150 MG Oral Tab Take 1 tablet (150 mg total) by mouth once for 1 dose. 1 tablet 0    methylPREDNISolone (MEDROL) 4 MG Oral Tablet Therapy Pack As directed. 21 each 0    cyclobenzaprine 5 MG Oral Tab Take 1 tablet (5 mg total) by mouth 3 (three) times daily as needed for Muscle spasms. 30 tablet 0    chlorthalidone 25 MG Oral Tab Take 1 tablet (25 mg total) by mouth daily. 30 tablet 0    simethicone 125 MG Oral Chew Tab Chew 2 tablets (250 mg total) by mouth every 6 (six) hours as needed for FLATULENCE. 60 tablet 1    Omeprazole 40 MG Oral Capsule Delayed Release Take 1 capsule (40 mg total) by mouth daily. 30 capsule 3    methylPREDNISolone (MEDROL) 4 MG Oral Tablet Therapy Pack Dosepack: take as directed 1 each 0    amLODIPine 5 MG Oral Tab Take 1 tablet (5 mg total) by mouth daily. 90 tablet 2    Psyllium 58.6 % Oral Powder Take 2 tablespoons in 6 oz of water at nightly. 660 g 7      Past Medical History:   Diagnosis Date    Quiñones's esophagus 2011    Encounter for PPD test 10/13/2012    Gastrointestinal disease     H Pylori      Social History:  Social History     Socioeconomic History    Marital status:    Tobacco Use    Smoking status: Never    Smokeless tobacco: Never   Vaping Use    Vaping Use: Never used   Substance and Sexual Activity    Alcohol use: No     Alcohol/week: 0.0 standard drinks of alcohol    Drug use: No          EXAM:   /81   Pulse 73   Ht 5' (1.524 m)   Wt 170 lb (77.1 kg)   LMP 02/13/2024 (Approximate)   BMI 33.20 kg/m²     Physical Exam  Vitals and nursing note reviewed.   Constitutional:       General: She is not in acute distress.  Pulmonary:      Effort: Pulmonary effort is normal.    Genitourinary:     General: Normal vulva.      Cervix: Discharge and friability present.   Neurological:      Mental Status: She is alert and oriented to person, place, and time.   Psychiatric:         Mood and Affect: Mood normal.         Behavior: Behavior normal.            ASSESSMENT AND PLAN:   1. Cervical cancer screening  Sample sent for evaluation  - ThinPrep PAP Smear; Future  - Hpv Dna  High Risk , Thin Prep Collect; Future  - ThinPrep PAP Smear  - Hpv Dna  High Risk , Thin Prep Collect    2. Acute vaginitis  Patient appears to be presenting with Candida vaginosis, prescription sent, sample obtained, declined STD testing  - Vaginitis Vaginosis PCR Panel; Future  - fluconazole 150 MG Oral Tab; Take 1 tablet (150 mg total) by mouth once for 1 dose.  Dispense: 1 tablet; Refill: 0  - Vaginitis Vaginosis PCR Panel       The patient indicates understanding of these issues and agrees to the plan.      The above note was creating using Dragon speech recognition technology. Please excuse any typos.

## 2024-03-12 ENCOUNTER — TELEPHONE (OUTPATIENT)
Dept: FAMILY MEDICINE CLINIC | Facility: CLINIC | Age: 44
End: 2024-03-12

## 2024-03-12 LAB
BV BACTERIA DNA VAG QL NAA+PROBE: NEGATIVE
C GLABRATA DNA VAG QL NAA+PROBE: NEGATIVE
C KRUSEI DNA VAG QL NAA+PROBE: NEGATIVE
CANDIDA DNA VAG QL NAA+PROBE: NEGATIVE
HPV I/H RISK 1 DNA SPEC QL NAA+PROBE: NEGATIVE
T VAGINALIS DNA VAG QL NAA+PROBE: NEGATIVE

## 2024-03-12 NOTE — TELEPHONE ENCOUNTER
Left message for patient to  return call, see below.    ----- Message from Brenna Pepe MD sent at 3/12/2024 11:18 AM CDT -----  Results reviewed. Tests show no significant abnormalities. Please inform patient.

## 2024-04-10 ENCOUNTER — OFFICE VISIT (OUTPATIENT)
Dept: FAMILY MEDICINE CLINIC | Facility: CLINIC | Age: 44
End: 2024-04-10

## 2024-04-10 VITALS
SYSTOLIC BLOOD PRESSURE: 129 MMHG | WEIGHT: 171.38 LBS | HEART RATE: 71 BPM | BODY MASS INDEX: 33.65 KG/M2 | HEIGHT: 60 IN | DIASTOLIC BLOOD PRESSURE: 86 MMHG

## 2024-04-10 DIAGNOSIS — M72.2 PLANTAR FASCIITIS, LEFT: ICD-10-CM

## 2024-04-10 DIAGNOSIS — Z12.11 COLON CANCER SCREENING: ICD-10-CM

## 2024-04-10 DIAGNOSIS — Z00.00 PHYSICAL EXAM: Primary | ICD-10-CM

## 2024-04-10 DIAGNOSIS — N63.23 MASS OF LOWER OUTER QUADRANT OF LEFT BREAST: ICD-10-CM

## 2024-04-10 RX ORDER — CHLORTHALIDONE 25 MG/1
25 TABLET ORAL DAILY
Qty: 30 TABLET | Refills: 0 | Status: SHIPPED | OUTPATIENT
Start: 2024-04-10

## 2024-04-10 RX ORDER — AMLODIPINE BESYLATE 5 MG/1
5 TABLET ORAL DAILY
Qty: 90 TABLET | Refills: 2 | Status: SHIPPED | OUTPATIENT
Start: 2024-04-10

## 2024-04-10 NOTE — PROGRESS NOTES
4/10/2024  10:26 AM    Jacy Hernandez is a 43 year old female.    Chief complaint(s):   Chief Complaint   Patient presents with    Routine Physical    Heel Pain     HPI:     Jacy Hernandez primary complaint is regarding CPE.     Jacy Hernandez is a 43 year old female present today for a routine periodic health gynecological screening and/or complete physical examination.  Her last physical exam was 2 year(s) ago. Patient's last menstrual period was 03/13/2024 (approximate).   Menarche occurred at age11 .  She is currently using  none as a form of contraception.    Jacy Hernandez is G 4, P4, Ab 0.   She has a history of veneral infection significant for none.   She performs breast self-exams occationally .  Her last TDAP (orTD) booster was 2 years ago.  She is current with her influenza & COVID immunization.  Her last Pap smear was 1 year(s) ago and was normal .  Her last mammogram was none.  Regarding colon cancer  screening test she underwent colonoscopy none. None smoker.         HISTORY:  Past Medical History:    Quiñones's esophagus    Encounter for PPD test    Gastrointestinal disease    H Pylori      Past Surgical History:   Procedure Laterality Date    Laparoscopic cholecystectomy  8/14    Other surgical history  2013    EGD      No family history on file.   Social History:   Social History     Socioeconomic History    Marital status:    Tobacco Use    Smoking status: Never    Smokeless tobacco: Never   Vaping Use    Vaping status: Never Used   Substance and Sexual Activity    Alcohol use: No     Alcohol/week: 0.0 standard drinks of alcohol    Drug use: No        Immunizations:   Immunization History   Administered Date(s) Administered    TD 10/13/2012    TDAP 08/16/2022       Medications (Active prior to today's visit):  Current Outpatient Medications   Medication Sig Dispense Refill    amLODIPine 5 MG Oral Tab Take 1 tablet (5 mg total) by mouth daily. 90 tablet 2     chlorthalidone 25 MG Oral Tab Take 1 tablet (25 mg total) by mouth daily. 30 tablet 0    methylPREDNISolone (MEDROL) 4 MG Oral Tablet Therapy Pack As directed. (Patient not taking: Reported on 4/10/2024) 21 each 0    cyclobenzaprine 5 MG Oral Tab Take 1 tablet (5 mg total) by mouth 3 (three) times daily as needed for Muscle spasms. (Patient not taking: Reported on 4/10/2024) 30 tablet 0    simethicone 125 MG Oral Chew Tab Chew 2 tablets (250 mg total) by mouth every 6 (six) hours as needed for FLATULENCE. (Patient not taking: Reported on 4/10/2024) 60 tablet 1    Omeprazole 40 MG Oral Capsule Delayed Release Take 1 capsule (40 mg total) by mouth daily. (Patient not taking: Reported on 4/10/2024) 30 capsule 3    methylPREDNISolone (MEDROL) 4 MG Oral Tablet Therapy Pack Dosepack: take as directed (Patient not taking: Reported on 4/10/2024) 1 each 0    Psyllium 58.6 % Oral Powder Take 2 tablespoons in 6 oz of water at nightly. (Patient not taking: Reported on 4/10/2024) 660 g 7       Allergies:  No Known Allergies      ROS:   Review of Systems   Constitutional:  Negative for appetite change, fatigue and fever.   HENT:  Negative for ear pain, hearing loss and nosebleeds.    Eyes:  Negative for visual disturbance.   Respiratory:  Negative for apnea, shortness of breath and wheezing.    Cardiovascular:  Negative for chest pain, palpitations and leg swelling.   Gastrointestinal:  Negative for abdominal pain, blood in stool, constipation, nausea and vomiting.   Endocrine: Negative for polydipsia and polyuria.   Genitourinary:  Negative for dyspareunia, hematuria and menstrual problem.   Musculoskeletal:  Negative for arthralgias and back pain.   Skin:  Negative for rash.   Allergic/Immunologic: Negative for food allergies.   Neurological:  Negative for dizziness, syncope, light-headedness and headaches.   Psychiatric/Behavioral:  Negative for sleep disturbance.        PHYSICAL EXAM:   VS: /86   Pulse 71   Ht 5'  (1.524 m)   Wt 171 lb 6.4 oz (77.7 kg)   LMP 03/13/2024 (Approximate)   BMI 33.47 kg/m²     Physical Exam  Vitals reviewed.   Constitutional:       Appearance: She is well-developed.   HENT:      Head: Normocephalic.      Right Ear: Hearing, tympanic membrane, ear canal and external ear normal.      Left Ear: Hearing, tympanic membrane, ear canal and external ear normal.      Nose: Nose normal.      Mouth/Throat:      Mouth: Mucous membranes are moist.   Eyes:      Extraocular Movements: Extraocular movements intact.      Conjunctiva/sclera: Conjunctivae normal.      Pupils: Pupils are equal, round, and reactive to light.   Neck:      Thyroid: No thyromegaly.   Cardiovascular:      Rate and Rhythm: Normal rate and regular rhythm.      Heart sounds: Normal heart sounds, S1 normal and S2 normal. No murmur heard.  Pulmonary:      Effort: Pulmonary effort is normal.      Breath sounds: Normal breath sounds.   Chest:   Breasts:     Right: No mass.      Left: Mass (2 o'clock) and tenderness present.   Abdominal:      General: Bowel sounds are normal.      Palpations: Abdomen is soft. There is no mass.      Tenderness: There is no abdominal tenderness.      Hernia: No hernia is present.   Musculoskeletal:      Cervical back: Neck supple.      Comments: Spine without scoliosis     Range of motions of both upper and lower extremities are normal.   Lymphadenopathy:      Cervical: No cervical adenopathy.      Comments: LEs no edema    Skin:     Findings: No rash.   Neurological:      General: No focal deficit present.      Mental Status: She is alert.      Deep Tendon Reflexes:      Reflex Scores:       Patellar reflexes are 2+ on the right side and 2+ on the left side.  Psychiatric:         Mood and Affect: Mood and affect normal.         LABORATORY RESULTS:   No results found for: \"URCOLOR\", \"URCLA\", \"URINELEUK\", \"URINENITRITE\", \"URINEBLOOD\"   Results for orders placed or performed in visit on 03/11/24   Hpv Dna  High  Risk , Thin Prep Collect   Result Value Ref Range    HPV High Risk Negative Negative    HPV Source Cervical/endocervical    THINPREP PAP SMEAR ONLY   Result Value Ref Range    Interpretation/Result Negative for intraepithelial lesion or malignancy Negative for intraepithelial lesion or malignancy    Specimen Adequacy       Satisfactory for evaluation. Endocervical or metaplastic cells present    General Categorization Negative for intraepithelial lesion or malignancy       HPV High Risk mRNA       Negative  Normal        Recommendations/Comments       Screened by the Thinprep Imaging System and a cytotechnologist.      Embedded Images      Procedure       Monolayers:  1, specimen collected in Thinprep vial, 20 ml Preservcyt      Clinical Information       Z12.4 Cervical Cancer Screening.         Reason for testing Screening     Gyn Additional Information       NOTE:  The Pap smear is a screening test that aids in the detection of cervical cancer and its precursors.  False negative and false positive results can occur. Regular sampling is recommended to minimize false negative results.    Bloody specimen      Case Report       Gynecologic Cytology                              Case: [id number]                                  Authorizing Provider:  Brenna Fraser,   Collected:           03/11/2024 05:15 PM                                 MD                                                                           Ordering Location:     [facility]    Received:            03/12/2024 09:51 AM                                 Group, [address], [place] First Screen:          Brina Baltazar                                                               Rescreen:              Ricardo Harris MD                                                          Specimen:    ThinPrep Imager Screening Pap, Cervical/endocervical                                      Vaginitis Vaginosis PCR Panel    Specimen: Vaginal; Other   Result Value Ref Range    Bacterial Vaginosis Negative Negative    Candida group Negative Negative    Nakaseomyces glabrata (Candida glabrata) Negative Negative    Pichia kudriavzevii (Candida krusei) Negative Negative    Trichomonas vaginalis Negative Negative     EKG / Spirometry : -     Radiology: No results found.     ASSESSMENT/PLAN:   Assessment   Encounter Diagnoses   Name Primary?    Physical exam Yes    Colon cancer screening     Mass of lower outer quadrant of left breast     Plantar fasciitis, left            Assessment and Plan:     Jacy Hernandez Health checkup as follows:    LABORATORY & ORDERS:   Orders Placed This Encounter   Procedures    CBC With Differential With Platelet    Comp Metabolic Panel (14)    Hemoglobin A1C    Lipid Panel    TSH W Reflex To Free T4    Vitamin D    Urinalysis with Culture Reflex      REFERRALS: generated today : EVALUATE & TREAT, GASTRO (INTERNAL)  VALE YANCY 2D+3D DIAGNOSTIC ADDL VWS BILAT (COT=[id number])  XR FOOT (2 VIEW), LEFT (CPT=73620) .    IMMUNIZATIONS ordered and given today include: none.    RECOMMENDATIONS given include: ANTICIPATORY GUIDANCE  topics covered today include: safety (i.e. seat belts, helmets, sunscreen, protective sports gear ), nutrition (i.e. healthy meals and snacks (i.e. avoid junk food and high-carbohydrate foods); athletic conditioning, fluids; low fat milk, limit to less than 20 oz. a day; dental care with her dentist), and healthy habits & social competence & responsibilities: Recommendations on physical activity; exercise daily or at least 3 times a week for 30-60 minutes doing cardiovascular exercise. Patient educated on self breast examination to be done on a monthly basis.  Consider a  if over weight and/or having difficult in staying active.  Attempt to keep a schedule that includes adequate sleep, and physical activities/exercise. Patient was educated on sexual transmitted disease. Best to abstain from sexual intercourse until she is ready to form a family. Use of condoms may prevent transmission of infections as well as pregnancy.   Contraception option chosen by patient was none.  REFUSALS:  Although recommended, the patient refuses the following: none .      FOLLOW-UP: Schedule a follow-up visit in 12 months.            Orders This Visit:  Orders Placed This Encounter   Procedures    CBC With Differential With Platelet    Comp Metabolic Panel (14)    Hemoglobin A1C    Lipid Panel    TSH W Reflex To Free T4    Vitamin D    Urinalysis with Culture Reflex       Meds This Visit:  Requested Prescriptions     Signed Prescriptions Disp Refills    amLODIPine 5 MG Oral Tab 90 tablet 2     Sig: Take 1 tablet (5 mg total) by mouth daily.    chlorthalidone 25 MG Oral Tab 30 tablet 0     Sig: Take 1 tablet (25 mg total) by mouth daily.       Imaging & Referrals:  EVALUATE & TREAT, GASTRO (INTERNAL)  VALE YANCY 2D+3D DIAGNOSTIC ADDL VWS BILAT (KSJ=[id number])  XR FOOT (2 VIEW), LEFT (CPT=73620)         SUZANNE CARUSO MD

## 2024-04-11 ENCOUNTER — LAB ENCOUNTER (OUTPATIENT)
Dept: LAB | Age: 44
End: 2024-04-11
Attending: FAMILY MEDICINE
Payer: COMMERCIAL

## 2024-04-11 DIAGNOSIS — Z00.00 PHYSICAL EXAM: ICD-10-CM

## 2024-04-11 LAB
ALBUMIN SERPL-MCNC: 4.3 G/DL (ref 3.2–4.8)
ALBUMIN/GLOB SERPL: 1.3 {RATIO} (ref 1–2)
ALP LIVER SERPL-CCNC: 79 U/L
ALT SERPL-CCNC: 33 U/L
ANION GAP SERPL CALC-SCNC: 8 MMOL/L (ref 0–18)
AST SERPL-CCNC: 23 U/L (ref ?–34)
BASOPHILS # BLD AUTO: 0.05 X10(3) UL (ref 0–0.2)
BASOPHILS NFR BLD AUTO: 1.2 %
BILIRUB SERPL-MCNC: 0.5 MG/DL (ref 0.3–1.2)
BILIRUB UR QL: NEGATIVE
BUN BLD-MCNC: 12 MG/DL (ref 9–23)
BUN/CREAT SERPL: 19.4 (ref 10–20)
CALCIUM BLD-MCNC: 9.3 MG/DL (ref 8.7–10.4)
CHLORIDE SERPL-SCNC: 110 MMOL/L (ref 98–112)
CHOLEST SERPL-MCNC: 184 MG/DL (ref ?–200)
CLARITY UR: CLEAR
CO2 SERPL-SCNC: 22 MMOL/L (ref 21–32)
CREAT BLD-MCNC: 0.62 MG/DL
DEPRECATED RDW RBC AUTO: 42.5 FL (ref 35.1–46.3)
EGFRCR SERPLBLD CKD-EPI 2021: 113 ML/MIN/1.73M2 (ref 60–?)
EOSINOPHIL # BLD AUTO: 0.1 X10(3) UL (ref 0–0.7)
EOSINOPHIL NFR BLD AUTO: 2.4 %
ERYTHROCYTE [DISTWIDTH] IN BLOOD BY AUTOMATED COUNT: 12.8 % (ref 11–15)
EST. AVERAGE GLUCOSE BLD GHB EST-MCNC: 157 MG/DL (ref 68–126)
FASTING PATIENT LIPID ANSWER: YES
FASTING STATUS PATIENT QL REPORTED: YES
GLOBULIN PLAS-MCNC: 3.4 G/DL (ref 2.8–4.4)
GLUCOSE BLD-MCNC: 134 MG/DL (ref 70–99)
GLUCOSE UR-MCNC: NORMAL MG/DL
HBA1C MFR BLD: 7.1 % (ref ?–5.7)
HCT VFR BLD AUTO: 39.2 %
HDLC SERPL-MCNC: 39 MG/DL (ref 40–59)
HGB BLD-MCNC: 13.8 G/DL
IMM GRANULOCYTES # BLD AUTO: 0.01 X10(3) UL (ref 0–1)
IMM GRANULOCYTES NFR BLD: 0.2 %
KETONES UR-MCNC: NEGATIVE MG/DL
LDLC SERPL CALC-MCNC: 126 MG/DL (ref ?–100)
LEUKOCYTE ESTERASE UR QL STRIP.AUTO: 25
LYMPHOCYTES # BLD AUTO: 1.72 X10(3) UL (ref 1–4)
LYMPHOCYTES NFR BLD AUTO: 41 %
MCH RBC QN AUTO: 31.9 PG (ref 26–34)
MCHC RBC AUTO-ENTMCNC: 35.2 G/DL (ref 31–37)
MCV RBC AUTO: 90.7 FL
MONOCYTES # BLD AUTO: 0.33 X10(3) UL (ref 0.1–1)
MONOCYTES NFR BLD AUTO: 7.9 %
NEUTROPHILS # BLD AUTO: 1.99 X10 (3) UL (ref 1.5–7.7)
NEUTROPHILS # BLD AUTO: 1.99 X10(3) UL (ref 1.5–7.7)
NEUTROPHILS NFR BLD AUTO: 47.3 %
NITRITE UR QL STRIP.AUTO: NEGATIVE
NONHDLC SERPL-MCNC: 145 MG/DL (ref ?–130)
OSMOLALITY SERPL CALC.SUM OF ELEC: 292 MOSM/KG (ref 275–295)
PH UR: 6.5 [PH] (ref 5–8)
PLATELET # BLD AUTO: 315 10(3)UL (ref 150–450)
POTASSIUM SERPL-SCNC: 4 MMOL/L (ref 3.5–5.1)
PROT SERPL-MCNC: 7.7 G/DL (ref 5.7–8.2)
PROT UR-MCNC: NEGATIVE MG/DL
RBC # BLD AUTO: 4.32 X10(6)UL
RBC #/AREA URNS AUTO: >10 /HPF
SODIUM SERPL-SCNC: 140 MMOL/L (ref 136–145)
SP GR UR STRIP: 1.02 (ref 1–1.03)
TRIGL SERPL-MCNC: 102 MG/DL (ref 30–149)
TSI SER-ACNC: 2.49 MIU/ML (ref 0.55–4.78)
UROBILINOGEN UR STRIP-ACNC: NORMAL
VIT D+METAB SERPL-MCNC: 11.6 NG/ML (ref 30–100)
VLDLC SERPL CALC-MCNC: 18 MG/DL (ref 0–30)
WBC # BLD AUTO: 4.2 X10(3) UL (ref 4–11)

## 2024-04-11 PROCEDURE — 84443 ASSAY THYROID STIM HORMONE: CPT

## 2024-04-11 PROCEDURE — 82306 VITAMIN D 25 HYDROXY: CPT

## 2024-04-11 PROCEDURE — 81001 URINALYSIS AUTO W/SCOPE: CPT

## 2024-04-11 PROCEDURE — 36415 COLL VENOUS BLD VENIPUNCTURE: CPT

## 2024-04-11 PROCEDURE — 83036 HEMOGLOBIN GLYCOSYLATED A1C: CPT

## 2024-04-11 PROCEDURE — 80053 COMPREHEN METABOLIC PANEL: CPT

## 2024-04-11 PROCEDURE — 87086 URINE CULTURE/COLONY COUNT: CPT

## 2024-04-11 PROCEDURE — 85025 COMPLETE CBC W/AUTO DIFF WBC: CPT

## 2024-04-11 PROCEDURE — 80061 LIPID PANEL: CPT

## 2024-04-11 RX ORDER — ERGOCALCIFEROL 1.25 MG/1
50000 CAPSULE ORAL WEEKLY
Qty: 12 CAPSULE | Refills: 3 | Status: SHIPPED | OUTPATIENT
Start: 2024-04-11 | End: 2025-04-11

## 2024-04-24 ENCOUNTER — OFFICE VISIT (OUTPATIENT)
Dept: FAMILY MEDICINE CLINIC | Facility: CLINIC | Age: 44
End: 2024-04-24
Payer: COMMERCIAL

## 2024-04-24 VITALS
WEIGHT: 166.81 LBS | SYSTOLIC BLOOD PRESSURE: 124 MMHG | BODY MASS INDEX: 32.75 KG/M2 | DIASTOLIC BLOOD PRESSURE: 81 MMHG | HEIGHT: 60 IN | HEART RATE: 71 BPM

## 2024-04-24 DIAGNOSIS — E11.9 TYPE 2 DIABETES MELLITUS WITHOUT COMPLICATION, WITHOUT LONG-TERM CURRENT USE OF INSULIN (HCC): Primary | ICD-10-CM

## 2024-04-24 PROCEDURE — 99214 OFFICE O/P EST MOD 30 MIN: CPT | Performed by: FAMILY MEDICINE

## 2024-04-24 RX ORDER — BLOOD SUGAR DIAGNOSTIC
STRIP MISCELLANEOUS
Qty: 100 STRIP | Refills: 3 | Status: SHIPPED | OUTPATIENT
Start: 2024-04-24

## 2024-04-24 RX ORDER — BLOOD-GLUCOSE METER
EACH MISCELLANEOUS
Qty: 1 KIT | Refills: 0 | Status: SHIPPED | OUTPATIENT
Start: 2024-04-24

## 2024-04-24 RX ORDER — LANCETS
EACH MISCELLANEOUS
Qty: 100 EACH | Refills: 3 | Status: SHIPPED | OUTPATIENT
Start: 2024-04-24

## 2024-04-24 RX ORDER — FLUCONAZOLE 150 MG/1
TABLET ORAL
COMMUNITY
Start: 2024-03-11 | End: 2024-04-24

## 2024-04-24 NOTE — PROGRESS NOTES
4/24/2024  10:55 AM    Jacy Hernandez is a 43 year old female.    Chief complaint(s):   Chief Complaint   Patient presents with    Test Results     A1c      HPI:     Jacy Hernandez primary complaint is regarding new onset diabetes.     Patient Jacy Hernandez is a 43 year old female is here to be evaluated for type 2 diabetes.  Specifically, female has type 2, insulin none requiring diabetes. Compliance with treatment has been good.  Patient's diabetes was first diagnosed 2024.  Patient follows a 1800 calorie ADA diet.  Patient report experiencing the following diabetes related symptoms; Positive for polyuria, Positive for polydipsia, Positive for blurred vision.  Depression symptoms include none.  Tobacco screen: none  smoker.  Current meds include :  oral hypoglycemic include: NONE  insulin/injectable : -   GLP-1 agonist : -  Statins medication: -  Hypoglycemia episodes is not applicable. she reports home blood glucose readings have been ? (#s) and believes  having good glucose control.  Most recent lab results include glycohemoglobin 7.1 %, microalbuminuria have been ?. Last GFR was > 60.   In regard to preventative care, her last ophthalmology exam was in > 12 months ago.  Opthalmic evaluation have shown no pathology.  Concurrent relative health problems include overweight.         HISTORY:  Past Medical History:    Quiñones's esophagus    Encounter for PPD test    Gastrointestinal disease    H Pylori      Past Surgical History:   Procedure Laterality Date    Laparoscopic cholecystectomy  8/14    Other surgical history  2013    EGD      No family history on file.   Social History:   Social History     Socioeconomic History    Marital status:    Tobacco Use    Smoking status: Never    Smokeless tobacco: Never   Vaping Use    Vaping status: Never Used   Substance and Sexual Activity    Alcohol use: No     Alcohol/week: 0.0 standard drinks of alcohol    Drug use: No         Immunizations:   Immunization History   Administered Date(s) Administered    TD 10/13/2012    TDAP 08/16/2022       Medications (Active prior to today's visit):  Current Outpatient Medications   Medication Sig Dispense Refill    Accu-Chek Softclix Lancets Does not apply Misc Use Q day 100 each 3    Blood Glucose Monitoring Suppl (ACCU-CHEK GUIDE) w/Device Does not apply Kit Use q day 1 kit 0    Glucose Blood (ACCU-CHEK GUIDE) In Vitro Strip Use Q day 100 strip 3    metFORMIN 850 MG Oral Tab Take 1 tablet (850 mg total) by mouth daily with breakfast. 90 tablet 2    amLODIPine 5 MG Oral Tab Take 1 tablet (5 mg total) by mouth daily. 90 tablet 2    chlorthalidone 25 MG Oral Tab Take 1 tablet (25 mg total) by mouth daily. 30 tablet 0    ergocalciferol 1.25 MG (33067 UT) Oral Cap Take 1 capsule (50,000 Units total) by mouth once a week. (Patient not taking: Reported on 4/24/2024) 12 capsule 3       Allergies:  No Known Allergies      ROS:   Review of Systems   Constitutional:  Negative for appetite change and fever.   Eyes:  Positive for visual disturbance.   Respiratory:  Negative for shortness of breath.    Cardiovascular:  Negative for chest pain.   Gastrointestinal:  Negative for abdominal pain, nausea and vomiting.   Endocrine: Positive for polydipsia and polyuria.   Musculoskeletal:  Negative for back pain.   Skin:  Negative for rash.   Neurological:  Negative for dizziness and headaches.       PHYSICAL EXAM:   VS: /81   Pulse 71   Ht 5' (1.524 m)   Wt 166 lb 12.8 oz (75.7 kg)   LMP 03/13/2024 (Approximate)   BMI 32.58 kg/m²     Physical Exam  Vitals reviewed.   Constitutional:       General: She is not in acute distress.     Appearance: Normal appearance.   HENT:      Head: Normocephalic.   Eyes:      Conjunctiva/sclera: Conjunctivae normal.   Cardiovascular:      Rate and Rhythm: Normal rate.   Pulmonary:      Effort: Pulmonary effort is normal.   Musculoskeletal:      Cervical back: Neck supple.    Skin:     Findings: No rash.   Psychiatric:         Mood and Affect: Mood normal.         LABORATORY RESULTS:   No results found for: \"URCOLOR\", \"URCLA\", \"URINELEUK\", \"URINENITRITE\", \"URINEBLOOD\"   Results for orders placed or performed in visit on 04/11/24   Comp Metabolic Panel (14)   Result Value Ref Range    Glucose 134 (H) 70 - 99 mg/dL    Sodium 140 136 - 145 mmol/L    Potassium 4.0 3.5 - 5.1 mmol/L    Chloride 110 98 - 112 mmol/L    CO2 22.0 21.0 - 32.0 mmol/L    Anion Gap 8 0 - 18 mmol/L    BUN 12 9 - 23 mg/dL    Creatinine 0.62 0.55 - 1.02 mg/dL    BUN/CREA Ratio 19.4 10.0 - 20.0    Calcium, Total 9.3 8.7 - 10.4 mg/dL    Calculated Osmolality 292 275 - 295 mOsm/kg    eGFR-Cr 113 >=60 mL/min/1.73m2    ALT 33 10 - 49 U/L    AST 23 <=34 U/L    Alkaline Phosphatase 79 37 - 98 U/L    Bilirubin, Total 0.5 0.3 - 1.2 mg/dL    Total Protein 7.7 5.7 - 8.2 g/dL    Albumin 4.3 3.2 - 4.8 g/dL    Globulin  3.4 2.8 - 4.4 g/dL    A/G Ratio 1.3 1.0 - 2.0    Patient Fasting for CMP? Yes    Hemoglobin A1C   Result Value Ref Range    HgbA1C 7.1 (H) <5.7 %    Estimated Average Glucose 157 (H) 68 - 126 mg/dL   Lipid Panel   Result Value Ref Range    Cholesterol, Total 184 <200 mg/dL    HDL Cholesterol 39 (L) 40 - 59 mg/dL    Triglycerides 102 30 - 149 mg/dL    LDL Cholesterol 126 (H) <100 mg/dL    VLDL 18 0 - 30 mg/dL    Non HDL Chol 145 (H) <130 mg/dL    Patient Fasting for Lipid? Yes    TSH W Reflex To Free T4   Result Value Ref Range    TSH 2.487 0.550 - 4.780 mIU/mL   Urinalysis with Culture Reflex    Specimen: Urine   Result Value Ref Range    Urine Color Light-Yellow Yellow    Clarity Urine Clear Clear    Spec Gravity 1.019 1.005 - 1.030    Glucose Urine Normal Normal mg/dL    Bilirubin Urine Negative Negative    Ketones Urine Negative Negative mg/dL    Blood Urine 3+ (A) Negative    pH Urine 6.5 5.0 - 8.0    Protein Urine Negative Negative mg/dL    Urobilinogen Urine Normal Normal    Nitrite Urine Negative Negative     Leukocyte Esterase Urine 25 (A) Negative    WBC Urine 1-5 0 - 5 /HPF    RBC Urine >10 (A) 0 - 2 /HPF    Bacteria Urine Rare (A) None Seen /HPF    Squamous Epi. Cells Few (A) None Seen /HPF    Renal Tubular Epithelial Cells None Seen None Seen /HPF    Transitional Cells None Seen None Seen /HPF    Yeast Urine None Seen None Seen /HPF   Vitamin D, 25-Hydroxy   Result Value Ref Range    Vitamin D, 25OH, Total 11.6 (L) 30.0 - 100.0 ng/mL   Urine Culture, Routine    Specimen: Urine, clean catch   Result Value Ref Range    Urine Culture No Growth at 18-24 hrs.    CBC W/ DIFFERENTIAL   Result Value Ref Range    WBC 4.2 4.0 - 11.0 x10(3) uL    RBC 4.32 3.80 - 5.30 x10(6)uL    HGB 13.8 12.0 - 16.0 g/dL    HCT 39.2 35.0 - 48.0 %    MCV 90.7 80.0 - 100.0 fL    MCH 31.9 26.0 - 34.0 pg    MCHC 35.2 31.0 - 37.0 g/dL    RDW-SD 42.5 35.1 - 46.3 fL    RDW 12.8 11.0 - 15.0 %    .0 150.0 - 450.0 10(3)uL    Neutrophil Absolute Prelim 1.99 1.50 - 7.70 x10 (3) uL    Neutrophil Absolute 1.99 1.50 - 7.70 x10(3) uL    Lymphocyte Absolute 1.72 1.00 - 4.00 x10(3) uL    Monocyte Absolute 0.33 0.10 - 1.00 x10(3) uL    Eosinophil Absolute 0.10 0.00 - 0.70 x10(3) uL    Basophil Absolute 0.05 0.00 - 0.20 x10(3) uL    Immature Granulocyte Absolute 0.01 0.00 - 1.00 x10(3) uL    Neutrophil % 47.3 %    Lymphocyte % 41.0 %    Monocyte % 7.9 %    Eosinophil % 2.4 %    Basophil % 1.2 %    Immature Granulocyte % 0.2 %       EKG / Spirometry : -     Radiology: No results found.     ASSESSMENT/PLAN:   Assessment   Encounter Diagnosis   Name Primary?    Type 2 diabetes mellitus without complication, without long-term current use of insulin (HCC) Yes       DIABETES A&P    LABORATORY & ORDERS: Blood test(s) ordered today ; No orders of the defined types were placed in this encounter.    Additional orders include:   MEDICATIONS:    Accu-Chek Softclix Lancets Does not apply Misc, Use Q day, Disp: 100 each, Rfl: 3    Blood Glucose Monitoring Suppl  (ACCU-CHEK GUIDE) w/Device Does not apply Kit, Use q day, Disp: 1 kit, Rfl: 0    Glucose Blood (ACCU-CHEK GUIDE) In Vitro Strip, Use Q day, Disp: 100 strip, Rfl: 3    metFORMIN 850 MG Oral Tab, Take 1 tablet (850 mg total) by mouth daily with breakfast., Disp: 90 tablet, Rfl: 2    amLODIPine 5 MG Oral Tab, Take 1 tablet (5 mg total) by mouth daily., Disp: 90 tablet, Rfl: 2    chlorthalidone 25 MG Oral Tab, Take 1 tablet (25 mg total) by mouth daily., Disp: 30 tablet, Rfl: 0    ergocalciferol 1.25 MG (99999 UT) Oral Cap, Take 1 capsule (50,000 Units total) by mouth once a week. (Patient not taking: Reported on 4/24/2024), Disp: 12 capsule, Rfl: 3.  Requested Prescriptions     Signed Prescriptions Disp Refills    Accu-Chek Softclix Lancets Does not apply Misc 100 each 3     Sig: Use Q day    Blood Glucose Monitoring Suppl (ACCU-CHEK GUIDE) w/Device Does not apply Kit 1 kit 0     Sig: Use q day    Glucose Blood (ACCU-CHEK GUIDE) In Vitro Strip 100 strip 3     Sig: Use Q day    metFORMIN 850 MG Oral Tab 90 tablet 2     Sig: Take 1 tablet (850 mg total) by mouth daily with breakfast.      REFERRALS:       Procedures    DIABETIC EDUCATION - INTERNAL     RECOMMENDATIONS: instructed in use of glucometer ( check fasting glucose daily), return for training in administering insulin injections, adherence to an 1800 calorie ADA diet,  10 pound weight loss, a graduated exercise program, HgbA1C level checked quarterly, daily foot self-inspection, need for yearly flu shots, and avoid all sodas, juices, candy, chocolates, cakes, ice cream, etc.      FOLLOW-UP: Schedule a follow-up visit in 6 months.       COUNSELING: The patient was counseled concerning the relationship between diabetes control and macrovascular disease including cardiovascular, cerebrovascular and peripheral vascular disease. The patient was counseled concerning the relationship between diabetes control and retinopathy, nephropathy, and neuropathy. Advised as to  the targets of pre-meal glucoses ( mg/dl) and post meal glucoses (<140-160 mg/dl) Home glucose testing discussed. The A1c target of <7% according to ADA and <6.5% according to AACE were discussed.         Orders This Visit:  No orders of the defined types were placed in this encounter.      Meds This Visit:  Requested Prescriptions     Signed Prescriptions Disp Refills    Accu-Chek Softclix Lancets Does not apply Misc 100 each 3     Sig: Use Q day    Blood Glucose Monitoring Suppl (ACCU-CHEK GUIDE) w/Device Does not apply Kit 1 kit 0     Sig: Use q day    Glucose Blood (ACCU-CHEK GUIDE) In Vitro Strip 100 strip 3     Sig: Use Q day    metFORMIN 850 MG Oral Tab 90 tablet 2     Sig: Take 1 tablet (850 mg total) by mouth daily with breakfast.       Imaging & Referrals:  DIABETIC EDUCATION - INTERNAL         SUZANNE CARUSO MD

## 2024-04-25 ENCOUNTER — TELEPHONE (OUTPATIENT)
Dept: FAMILY MEDICINE CLINIC | Facility: CLINIC | Age: 44
End: 2024-04-25

## 2024-04-25 NOTE — TELEPHONE ENCOUNTER
Patient called and said that script below as not received at pharmacy       [id number] - [place], [place] -   [address]       Medication Detail    Medication Quantity Refills Start End   metFORMIN 850 MG Oral Tab 90 tablet 2 4/24/2024 --   Sig:   Take 1 tablet (850 mg total) by mouth daily with breakfast.     Route:   Oral     Order #:   [id number]

## 2024-05-08 ENCOUNTER — OFFICE VISIT (OUTPATIENT)
Dept: FAMILY MEDICINE CLINIC | Facility: CLINIC | Age: 44
End: 2024-05-08
Payer: COMMERCIAL

## 2024-05-08 ENCOUNTER — LAB ENCOUNTER (OUTPATIENT)
Dept: LAB | Age: 44
End: 2024-05-08
Attending: FAMILY MEDICINE
Payer: COMMERCIAL

## 2024-05-08 VITALS
DIASTOLIC BLOOD PRESSURE: 78 MMHG | HEIGHT: 60 IN | SYSTOLIC BLOOD PRESSURE: 118 MMHG | HEART RATE: 68 BPM | BODY MASS INDEX: 32.55 KG/M2 | WEIGHT: 165.81 LBS

## 2024-05-08 DIAGNOSIS — Q61.5 MEDULLARY SPONGE KIDNEY: ICD-10-CM

## 2024-05-08 DIAGNOSIS — N20.0 RENAL STONES: ICD-10-CM

## 2024-05-08 DIAGNOSIS — M54.6 ACUTE RIGHT-SIDED THORACIC BACK PAIN: Primary | ICD-10-CM

## 2024-05-08 LAB
APPEARANCE: CLEAR
BILIRUBIN: NEGATIVE
GLUCOSE (URINE DIPSTICK): NEGATIVE MG/DL
KETONES (URINE DIPSTICK): NEGATIVE MG/DL
MULTISTIX LOT#: ABNORMAL NUMERIC
NITRITE, URINE: NEGATIVE
OCCULT BLOOD: NEGATIVE
PH, URINE: 6.5 (ref 4.5–8)
PROTEIN (URINE DIPSTICK): NEGATIVE MG/DL
PTH-INTACT SERPL-MCNC: 78.7 PG/ML (ref 18.5–88)
SPECIFIC GRAVITY: 1 (ref 1–1.03)
URINE-COLOR: YELLOW
UROBILINOGEN,SEMI-QN: 0.2 MG/DL (ref 0–1.9)

## 2024-05-08 PROCEDURE — 83970 ASSAY OF PARATHORMONE: CPT

## 2024-05-08 PROCEDURE — 99213 OFFICE O/P EST LOW 20 MIN: CPT | Performed by: FAMILY MEDICINE

## 2024-05-08 PROCEDURE — 81003 URINALYSIS AUTO W/O SCOPE: CPT | Performed by: FAMILY MEDICINE

## 2024-05-08 PROCEDURE — 36415 COLL VENOUS BLD VENIPUNCTURE: CPT

## 2024-05-08 RX ORDER — IBUPROFEN 600 MG/1
600 TABLET ORAL EVERY 6 HOURS PRN
Qty: 60 TABLET | Refills: 0 | Status: SHIPPED | OUTPATIENT
Start: 2024-05-08

## 2024-05-08 NOTE — PROGRESS NOTES
5/8/2024  12:37 PM    Jacy Hernandez is a 43 year old female.    Chief complaint(s):   Chief Complaint   Patient presents with    Medication Problem     PT states that her kidneys hurt every time she takes metformin.      HPI:     Jacy Hernandez primary complaint is regarding back pain.     Patient is a 43-year-old female who was recently diagnosed with diabetes type 2 and was placed on metformin.  Since has been taking her metformin she complains of right costovertebral angle and believes that the medication metformin is causing her pain.  Upon further reviewing her medical history she does have a history of right kidney stones as well as medullary sponge of the right kidney.  She denies any dysuria or hematuria.      HISTORY:  Past Medical History:    Quiñones's esophagus    Encounter for PPD test    Gastrointestinal disease    H Pylori      Past Surgical History:   Procedure Laterality Date    Laparoscopic cholecystectomy  8/14    Other surgical history  2013    EGD      No family history on file.   Social History:   Social History     Socioeconomic History    Marital status:    Tobacco Use    Smoking status: Never    Smokeless tobacco: Never   Vaping Use    Vaping status: Never Used   Substance and Sexual Activity    Alcohol use: No     Alcohol/week: 0.0 standard drinks of alcohol    Drug use: No        Immunizations:   Immunization History   Administered Date(s) Administered    TD 10/13/2012    TDAP 08/16/2022       Medications (Active prior to today's visit):  Current Outpatient Medications   Medication Sig Dispense Refill    ibuprofen 600 MG Oral Tab Take 1 tablet (600 mg total) by mouth every 6 (six) hours as needed for Pain. Always take it with food. 60 tablet 0    metFORMIN 850 MG Oral Tab Take 1 tablet (850 mg total) by mouth daily with breakfast. 90 tablet 2    Accu-Chek Softclix Lancets Does not apply Misc Use Q day 100 each 3    Glucose Blood (ACCU-CHEK GUIDE) In Vitro Strip Use Q  day 100 strip 3    amLODIPine 5 MG Oral Tab Take 1 tablet (5 mg total) by mouth daily. 90 tablet 2    chlorthalidone 25 MG Oral Tab Take 1 tablet (25 mg total) by mouth daily. 30 tablet 0       Allergies:  No Known Allergies      ROS:   Review of Systems   Constitutional:  Negative for appetite change and fever.   Eyes:  Negative for visual disturbance.   Respiratory:  Negative for shortness of breath.    Cardiovascular:  Negative for chest pain.   Gastrointestinal:  Negative for abdominal pain, nausea and vomiting.   Musculoskeletal:  Positive for back pain.   Skin:  Negative for rash.   Neurological:  Negative for dizziness and headaches.       PHYSICAL EXAM:   VS: /78 (BP Location: Left arm, Patient Position: Sitting, Cuff Size: adult)   Pulse 68   Ht 5' (1.524 m)   Wt 165 lb 12.8 oz (75.2 kg)   LMP 03/13/2024 (Approximate)   BMI 32.38 kg/m²     Physical Exam  Vitals reviewed.   Constitutional:       General: She is not in acute distress.     Appearance: Normal appearance.   HENT:      Head: Normocephalic.   Eyes:      Conjunctiva/sclera: Conjunctivae normal.   Cardiovascular:      Rate and Rhythm: Normal rate.   Pulmonary:      Effort: Pulmonary effort is normal.   Abdominal:      Tenderness: There is no right CVA tenderness or left CVA tenderness.   Musculoskeletal:      Cervical back: Neck supple.   Skin:     Findings: No rash.   Psychiatric:         Mood and Affect: Mood normal.         LABORATORY RESULTS:   No results found for: \"URCOLOR\", \"URCLA\", \"URINELEUK\", \"URINENITRITE\", \"URINEBLOOD\"   Results for orders placed or performed in visit on 05/08/24   URINALYSIS, AUTO, W/O SCOPE   Result Value Ref Range    Glucose Urine Negative Negative mg/dL    Bilirubin Urine Negative Negative    Ketones, UA Negative Negative - Trace mg/dL    Spec Gravity 1.005 1.005 - 1.030    Blood Urine Negative Negative    PH Urine 6.5 5.0 - 8.0    Protein Urine Negative Negative - Trace mg/dL    Urobilinogen Urine 0.2 0.2  - 1.0 mg/dL    Nitrite Urine Negative Negative    Leukocyte Esterase Urine Trace (A) Negative    APPEARANCE clear Clear    Color Urine yellow Yellow    Multistix Lot# [id number] Numeric    Multistix Expiration Date 12/31/24 Date     EKG / Spirometry : -     Radiology: No results found.     ASSESSMENT/PLAN:   Assessment   Encounter Diagnoses   Name Primary?    Acute right-sided thoracic back pain Yes    Medullary sponge kidney     Renal stones        MEDICATIONS:     Requested Prescriptions     Signed Prescriptions Disp Refills    ibuprofen 600 MG Oral Tab 60 tablet 0     Sig: Take 1 tablet (600 mg total) by mouth every 6 (six) hours as needed for Pain. Always take it with food.             LABORATORY & ORDERS:   Orders Placed This Encounter   Procedures    URINALYSIS, AUTO, W/O SCOPE    PTH, Intact [E]     REFERRALS: UROLOGY - INTERNAL,       Procedures    URINALYSIS, AUTO, W/O SCOPE    PTH, Intact [E]    UROLOGY - INTERNAL        RECOMMENDATIONS given include: Patient was reassured of  her medical condition and all questions and concerns were answered. Patient was informed to please, call our office with any new or further questions or concerns that may come up in the near future. Notify Dr Caruso or the [facility] if there is a deterioration or worsening of the medical condition. Also, inform the doctor with any new symptoms or medications' side effects.      FOLLOW-UP: Schedule a follow-up visit in 3 months/  prn.            Orders This Visit:  Orders Placed This Encounter   Procedures    URINALYSIS, AUTO, W/O SCOPE    PTH, Intact [E]       Meds This Visit:  Requested Prescriptions     Signed Prescriptions Disp Refills    ibuprofen 600 MG Oral Tab 60 tablet 0     Sig: Take 1 tablet (600 mg total) by mouth every 6 (six) hours as needed for Pain. Always take it with food.       Imaging & Referrals:  UROLOGY - INTERNAL         SUZANNE CARUSO MD

## 2024-07-29 NOTE — TELEPHONE ENCOUNTER
Patient called to request a refill on below medication. [facility] on file verified.     Patient advised she spoke with the pharmacy who told her she has no more refills left.       Medication Quantity Refills Start End   metFORMIN 850 MG Oral Tab 90 tablet 2 4/25/2024 --   Sig:   Take 1 tablet (850 mg total) by mouth daily with breakfast.     Route:   Oral     Note to Pharmacy:   Resubmitted RX, please fill order for patient     Order #:   [id number]

## 2024-08-01 NOTE — TELEPHONE ENCOUNTER
Please review. Protocol failed/ No protocol.    Requested Prescriptions   Pending Prescriptions Disp Refills    metFORMIN 850 MG Oral Tab 90 tablet 2     Sig: Take 1 tablet (850 mg total) by mouth daily with breakfast.       Diabetes Medication Protocol Failed - 7/29/2024  9:12 AM        Failed - Microalbumin procedure in past 12 months or taking ACE/ARB        Passed - Last A1C < 7.5 and within past 6 months     Lab Results   Component Value Date    A1C 7.1 (H) 04/11/2024             Passed - In person appointment or virtual visit in the past 6 mos or appointment in next 3 mos     Recent Outpatient Visits              2 [facility]    [facility] Visit    3 months ago Type 2 diabetes mellitus without complication, without long-term current use of insulin (HCC)    [facility]    [facility] Visit    3 months ago [facility] exam    [facility]    Office Visit    4 months ago Cervical cancer screening    [facility] [address], [place] Brenna Fraser MD    Office Visit    7 months ago [facility]          Future Appointments         Provider Department Appt Notes    In 2 months Hansel Morton MD [facility] Consult                    Passed - EGFRCR or GFRNAA > 50     GFR Evaluation  EGFRCR: 113 , resulted on 4/11/2024          Passed - GFR in the past 12 months

## 2024-11-20 DIAGNOSIS — E11.9 DIABETES MELLITUS WITHOUT COMPLICATION (HCC): Primary | ICD-10-CM

## 2024-12-06 ENCOUNTER — OFFICE VISIT (OUTPATIENT)
Dept: FAMILY MEDICINE CLINIC | Facility: CLINIC | Age: 44
End: 2024-12-06

## 2024-12-06 VITALS
HEART RATE: 69 BPM | HEIGHT: 60 IN | DIASTOLIC BLOOD PRESSURE: 88 MMHG | BODY MASS INDEX: 31.8 KG/M2 | SYSTOLIC BLOOD PRESSURE: 139 MMHG | WEIGHT: 162 LBS

## 2024-12-06 DIAGNOSIS — I10 ESSENTIAL HYPERTENSION: ICD-10-CM

## 2024-12-06 DIAGNOSIS — H10.33 ACUTE BACTERIAL CONJUNCTIVITIS OF BOTH EYES: ICD-10-CM

## 2024-12-06 DIAGNOSIS — E11.9 TYPE 2 DIABETES MELLITUS WITHOUT COMPLICATION, WITHOUT LONG-TERM CURRENT USE OF INSULIN (HCC): Primary | ICD-10-CM

## 2024-12-06 PROCEDURE — 99214 OFFICE O/P EST MOD 30 MIN: CPT | Performed by: FAMILY MEDICINE

## 2024-12-06 RX ORDER — AMLODIPINE BESYLATE 5 MG/1
5 TABLET ORAL DAILY
Qty: 90 TABLET | Refills: 2 | Status: SHIPPED | OUTPATIENT
Start: 2024-12-06

## 2024-12-06 RX ORDER — CIPROFLOXACIN HYDROCHLORIDE 3.5 MG/ML
2 SOLUTION/ DROPS TOPICAL
Qty: 10 ML | Refills: 0 | Status: SHIPPED | OUTPATIENT
Start: 2024-12-06 | End: 2024-12-13

## 2024-12-06 NOTE — PROGRESS NOTES
12/6/2024  9:24 AM    Jacy Hernandez is a 44 year old female.    Chief complaint(s):   Chief Complaint   Patient presents with    Diabetes    Hypertension    Eye Problem     HPI:     Jacy Hernandez primary complaint is regarding multiple complaints.     Patient Jacy Hernandez is a 43 year old female is here to be evaluated for type 2 diabetes.  Specifically, female has type 2, insulin none requiring diabetes. Compliance with treatment has been good.  Patient's diabetes was first diagnosed 2024.  Patient follows a 1800 calorie ADA diet.  Patient report experiencing the following diabetes related symptoms; Positive for polyuria, Positive for polydipsia, Positive for blurred vision.  Depression symptoms include none.  Tobacco screen: none  smoker.  Current meds include :  oral hypoglycemic include: Metformin 850 mg Q day  insulin/injectable : -   GLP-1 agonist : -  Statins medication: -  Hypoglycemia episodes is not applicable. she reports home blood glucose readings have been ? (#s) and believes  having good glucose control.  Most recent lab results include glycohemoglobin 7.1 %, microalbuminuria have been ?. Last GFR was > 60.   In regard to preventative care, her last ophthalmology exam was in > 12 months ago.  Opthalmic evaluation have shown no pathology.  Concurrent relative health problems include overweight.        Jacy Hernandezis a 44 year old female presents with hypertension.  This was first diagnosed  2023.  Current nonpharmacologic treatment includes low sodium diet, exercise, and meditation.  her current cardiac medication(s) regimen includes: Amlodipine 5 mg .  she has not kept a blood pressure diary, but states that her blood pressures have been fair controll.  she is tolerating her medication(s)  well without side effects.  Compliance with treatment has been fair.      Acute conjunctivitis details; Jacy Hernandez present complaining of bilateral eye infection  associated with crusting of eyes upon awakening,  purulent discharge, gritty sensation, and redness.  Patient denies any eye trauma.  Onset of the described symptoms was gradual.  Symptoms started 1 day . Also complains of, crusting.  Pertinent medical history is significant for DM .  Patient does not use of contact lenses.  She has not tried any particular treatment prior to this visit.  The patient has not been exposed to others with pink eye.       HISTORY:  Past Medical History:    Quiñones's esophagus    Encounter for PPD test    Gastrointestinal disease    H Pylori      Past Surgical History:   Procedure Laterality Date    Laparoscopic cholecystectomy  8/14    Other surgical history  2013    EGD      No family history on file.   Social History:   Social History     Socioeconomic History    Marital status:    Tobacco Use    Smoking status: Never    Smokeless tobacco: Never   Vaping Use    Vaping status: Never Used   Substance and Sexual Activity    Alcohol use: No     Alcohol/week: 0.0 standard drinks of alcohol    Drug use: No        Immunizations:   Immunization History   Administered Date(s) Administered    TD 10/13/2012    TDAP 08/16/2022       Medications (Active prior to today's visit):  Current Outpatient Medications   Medication Sig Dispense Refill    amLODIPine 5 MG Oral Tab Take 1 tablet (5 mg total) by mouth daily. 90 tablet 2    metFORMIN 850 MG Oral Tab Take 1 tablet (850 mg total) by mouth daily with breakfast. 90 tablet 2    ciprofloxacin (CILOXAN) 0.3 % Ophthalmic Solution Place 2 drops into both eyes every 2 (two) hours for 7 days. Apply to affected eye(s). 10 mL 0    Accu-Chek Softclix Lancets Does not apply Misc Use Q day 100 each 3    Glucose Blood (ACCU-CHEK GUIDE) In Vitro Strip Use Q day 100 strip 3    ibuprofen 600 MG Oral Tab Take 1 tablet (600 mg total) by mouth every 6 (six) hours as needed for Pain. Always take it with food. (Patient not taking: Reported on 12/6/2024) 60 tablet  0       Allergies:  Allergies[1]      ROS:   Review of Systems   Constitutional:  Negative for appetite change and fever.   Eyes:  Positive for redness and itching. Negative for visual disturbance.   Respiratory:  Negative for shortness of breath.    Cardiovascular:  Negative for chest pain, palpitations and leg swelling.   Gastrointestinal:  Negative for abdominal pain, nausea and vomiting.   Endocrine: Negative for polydipsia and polyuria.   Musculoskeletal:  Negative for back pain.   Skin:  Negative for rash.   Neurological:  Negative for dizziness and headaches.       PHYSICAL EXAM:   VS: BP (!) 153/97   Pulse 69   Ht 5' (1.524 m)   Wt 162 lb (73.5 kg)   LMP 11/20/2024 (Approximate)   BMI 31.64 kg/m²     Physical Exam  Vitals reviewed.   Constitutional:       General: She is not in acute distress.     Appearance: Normal appearance.   HENT:      Head: Normocephalic.   Eyes:      General:         Right eye: Discharge present.         Left eye: Discharge present.     Conjunctiva/sclera:      Right eye: Right conjunctiva is injected.      Left eye: Left conjunctiva is injected.   Cardiovascular:      Rate and Rhythm: Normal rate and regular rhythm.   Pulmonary:      Effort: Pulmonary effort is normal.      Breath sounds: Normal breath sounds.   Musculoskeletal:      Cervical back: Neck supple.   Skin:     Findings: No rash.   Psychiatric:         Mood and Affect: Mood normal.         LABORATORY RESULTS:     EKG / Spirometry : -     Radiology: No results found.     ASSESSMENT/PLAN:   Assessment   Encounter Diagnoses   Name Primary?    Type 2 diabetes mellitus without complication, without long-term current use of insulin (HCC) Yes    Essential hypertension     Acute bacterial conjunctivitis of both eyes        1. Type 2 diabetes mellitus without complication, without long-term current use of insulin (HCC)    DIABETES A&P    LABORATORY & ORDERS: Blood test(s) ordered today ; No orders of the defined types were  placed in this encounter.    Additional orders include:   MEDICATIONS:    amLODIPine 5 MG Oral Tab, Take 1 tablet (5 mg total) by mouth daily., Disp: 90 tablet, Rfl: 2    metFORMIN 850 MG Oral Tab, Take 1 tablet (850 mg total) by mouth daily with breakfast., Disp: 90 tablet, Rfl: 2    ciprofloxacin (CILOXAN) 0.3 % Ophthalmic Solution, Place 2 drops into both eyes every 2 (two) hours for 7 days. Apply to affected eye(s)., Disp: 10 mL, Rfl: 0    Accu-Chek Softclix Lancets Does not apply Misc, Use Q day, Disp: 100 each, Rfl: 3    Glucose Blood (ACCU-CHEK GUIDE) In Vitro Strip, Use Q day, Disp: 100 strip, Rfl: 3    ibuprofen 600 MG Oral Tab, Take 1 tablet (600 mg total) by mouth every 6 (six) hours as needed for Pain. Always take it with food. (Patient not taking: Reported on 12/6/2024), Disp: 60 tablet, Rfl: 0.  Requested Prescriptions     Signed Prescriptions Disp Refills    amLODIPine 5 MG Oral Tab 90 tablet 2     Sig: Take 1 tablet (5 mg total) by mouth daily.    metFORMIN 850 MG Oral Tab 90 tablet 2     Sig: Take 1 tablet (850 mg total) by mouth daily with breakfast.    ciprofloxacin (CILOXAN) 0.3 % Ophthalmic Solution 10 mL 0     Sig: Place 2 drops into both eyes every 2 (two) hours for 7 days. Apply to affected eye(s).      RECOMMENDATIONS: instructed in use of glucometer ( check fasting glucose daily), return for training in administering insulin injections, adherence to an 1800 calorie ADA diet,  5 pound weight loss, a graduated exercise program, HgbA1C level checked quarterly, daily foot self-inspection, need for yearly flu shots, and avoid all sodas, juices, candy, chocolates, cakes, ice cream, etc.      FOLLOW-UP: Schedule a follow-up visit in 3 months.       COUNSELING: The patient was counseled concerning the relationship between diabetes control and macrovascular disease including cardiovascular, cerebrovascular and peripheral vascular disease. The patient was counseled concerning the relationship between  diabetes control and retinopathy, nephropathy, and neuropathy. Advised as to the targets of pre-meal glucoses ( mg/dl) and post meal glucoses (<140-160 mg/dl) Home glucose testing discussed. The A1c target of <7% according to ADA and <6.5% according to AACE were discussed.           2. Essential hypertension      MEDICATIONS: CPM  Refills  Requested Prescriptions     Signed Prescriptions Disp Refills    amLODIPine 5 MG Oral Tab 90 tablet 2     Sig: Take 1 tablet (5 mg total) by mouth daily.    metFORMIN 850 MG Oral Tab 90 tablet 2     Sig: Take 1 tablet (850 mg total) by mouth daily with breakfast.    ciprofloxacin (CILOXAN) 0.3 % Ophthalmic Solution 10 mL 0     Sig: Place 2 drops into both eyes every 2 (two) hours for 7 days. Apply to affected eye(s).      LABORATORY & ORDERS: No orders of the defined types were placed in this encounter.    RECOMMENDATIONS given include: avoid pseudoephedrine or other stimulants/decongestants in common cold remedies, decrease consumption of alcohol, perform routine monitoring of blood pressure with home blood pressure cuff, exercise, reduction of dietary salt intake, take medication as prescribed, try not to miss doses, smoking cessation, weight loss, and stress reduction.    FOLLOW-UP: Schedule a follow-up visit in 3 weeks.         3. Acute bacterial conjunctivitis of both eyes    Acute conjunctivitis     MEDICATIONS:    ciprofloxacin (CILOXAN) 0.3 % Ophthalmic Solution 10 mL 0     Sig: Place 2 drops into both eyes every 2 (two) hours for 7 days. Apply to affected eye(s).   .   RECOMMENDATIONS given include: avoid rubbing eyes and apply warm compresses to affected eye(s) three times a day (every 8 hrs.) using a worm soak cloth for 10 minutes.  RECOMMENDATIONS given include: Patient was informed to call our office with any questions or concerns regarding today visit. Notify the doctor if there is a deterioration or worsening of the medical condition. Also, inform the doctor  with any new sings or symptoms or any side effects from the medications .    FOLLOW-UP: Instructed to call if new or worsening symptoms develop. Notify the clinic if symptoms don't improved after 24 hrs. Schedule follow-up appointments on a p.r.n. basis           Orders This Visit:  No orders of the defined types were placed in this encounter.      Meds This Visit:  Requested Prescriptions     Signed Prescriptions Disp Refills    amLODIPine 5 MG Oral Tab 90 tablet 2     Sig: Take 1 tablet (5 mg total) by mouth daily.    metFORMIN 850 MG Oral Tab 90 tablet 2     Sig: Take 1 tablet (850 mg total) by mouth daily with breakfast.    ciprofloxacin (CILOXAN) 0.3 % Ophthalmic Solution 10 mL 0     Sig: Place 2 drops into both eyes every 2 (two) hours for 7 days. Apply to affected eye(s).       Imaging & Referrals:  None         SUZANNE CARUSO MD       [1] No Known Allergies

## 2025-04-15 ENCOUNTER — OFFICE VISIT (OUTPATIENT)
Dept: FAMILY MEDICINE CLINIC | Facility: CLINIC | Age: 45
End: 2025-04-15

## 2025-04-15 VITALS
BODY MASS INDEX: 31 KG/M2 | SYSTOLIC BLOOD PRESSURE: 132 MMHG | DIASTOLIC BLOOD PRESSURE: 80 MMHG | HEART RATE: 67 BPM | WEIGHT: 158.13 LBS

## 2025-04-15 DIAGNOSIS — Z12.31 BREAST CANCER SCREENING BY MAMMOGRAM: ICD-10-CM

## 2025-04-15 DIAGNOSIS — I10 ESSENTIAL HYPERTENSION: ICD-10-CM

## 2025-04-15 DIAGNOSIS — E11.9 TYPE 2 DIABETES MELLITUS WITHOUT COMPLICATION, WITHOUT LONG-TERM CURRENT USE OF INSULIN (HCC): Primary | ICD-10-CM

## 2025-04-15 DIAGNOSIS — Z12.31 ENCOUNTER FOR SCREENING MAMMOGRAM FOR MALIGNANT NEOPLASM OF BREAST: ICD-10-CM

## 2025-04-15 LAB — HEMOGLOBIN A1C: 6.2 % (ref 4.3–5.6)

## 2025-04-15 PROCEDURE — 99214 OFFICE O/P EST MOD 30 MIN: CPT | Performed by: FAMILY MEDICINE

## 2025-04-15 PROCEDURE — 83036 HEMOGLOBIN GLYCOSYLATED A1C: CPT | Performed by: FAMILY MEDICINE

## 2025-04-15 RX ORDER — LANCETS
EACH MISCELLANEOUS
Qty: 100 EACH | Refills: 3 | Status: SHIPPED | OUTPATIENT
Start: 2025-04-15

## 2025-04-15 RX ORDER — AMLODIPINE BESYLATE 5 MG/1
5 TABLET ORAL DAILY
Qty: 90 TABLET | Refills: 2 | Status: SHIPPED | OUTPATIENT
Start: 2025-04-15

## 2025-04-15 NOTE — PROGRESS NOTES
4/15/2025  4:20 PM    Jacy Hernandez is a 44 year old female.    Chief complaint(s):   Chief Complaint   Patient presents with    Hypertension    Diabetes     HPI:     Jacy Hernandez primary complaint is regarding as above.     Patient Jacy Hernandez is a 44 year old female is here to be evaluated for type 2 diabetes.  Specifically, female has type 2, insulin none requiring diabetes. Compliance with treatment has been good.  Patient's diabetes was first diagnosed 2024.  Patient follows a 1800 calorie ADA diet.  Patient report experiencing the following diabetes related symptoms; Positive for polyuria, Positive for polydipsia, Positive for blurred vision.  Depression symptoms include none.  Tobacco screen: none  smoker.  Current meds include :  oral hypoglycemic include: Metformin 850 mg Q day  insulin/injectable : -   GLP-1 agonist : -  Statins medication: -  Hypoglycemia episodes is not applicable. she reports home blood glucose readings have been ? (#s) and believes  having good glucose control.  Most recent lab results include glycohemoglobin 7.1 %, microalbuminuria have been ?. Last GFR was > 60.   In regard to preventative care, her last ophthalmology exam was in > 12 months ago.  Opthalmic evaluation have shown no pathology.  Concurrent relative health problems include overweight.         Jacy Hernandezis a 44 year old female presents with hypertension.  This was first diagnosed  2023.  Current nonpharmacologic treatment includes low sodium diet, exercise, and meditation.  her current cardiac medication(s) regimen includes: Amlodipine 5 mg .  she has not kept a blood pressure diary, but states that her blood pressures have been fair controll.  she is tolerating her medication(s)  well without side effects.  Compliance with treatment has been fair.        HISTORY:  Past Medical History[1]   Past Surgical History[2]   Family History[3]   Social History: Short Social Hx on File[4]      Immunizations:   Immunization History   Administered Date(s) Administered    TD 10/13/2012    TDAP 08/16/2022       Medications (Active prior to today's visit):  Current Medications[5]    Allergies:  Allergies[6]      ROS:   Review of Systems   Constitutional:  Negative for appetite change and fever.   Eyes:  Negative for visual disturbance.   Respiratory:  Negative for shortness of breath.    Cardiovascular:  Negative for chest pain.   Gastrointestinal:  Negative for abdominal pain, nausea and vomiting.   Endocrine: Negative for polydipsia and polyuria.   Musculoskeletal:  Negative for back pain.   Skin:  Negative for rash.   Neurological:  Negative for dizziness and headaches.       PHYSICAL EXAM:   VS: /80   Pulse 67   Wt 158 lb 2 oz (71.7 kg)   LMP 04/06/2025 (Approximate)   BMI 30.88 kg/m²     Physical Exam  Vitals reviewed.   Constitutional:       General: She is not in acute distress.     Appearance: Normal appearance.   HENT:      Head: Normocephalic.   Eyes:      Conjunctiva/sclera: Conjunctivae normal.   Cardiovascular:      Rate and Rhythm: Normal rate.   Pulmonary:      Effort: Pulmonary effort is normal.   Musculoskeletal:      Cervical back: Neck supple.   Feet:      Right foot:      Protective Sensation: 10 sites tested.  10 sites sensed.      Left foot:      Protective Sensation: 10 sites tested.  10 sites sensed.   Skin:     Findings: No rash.   Psychiatric:         Mood and Affect: Mood normal.         LABORATORY RESULTS:   No results found for: \"URCOLOR\", \"URCLA\", \"URINELEUK\", \"URINENITRITE\", \"URINEBLOOD\"   Results for orders placed or performed in visit on 04/15/25   POC Glycohemoglobin [79833]    Collection Time: 04/15/25  4:24 PM   Result Value Ref Range    HEMOGLOBIN A1C 6.2 (A) 4.3 - 5.6 %    Cartridge Lot# [id number] Numeric    Cartridge Expiration Date 11-6-26 Date       EKG / Spirometry : -     Radiology: No results found.     ASSESSMENT/PLAN:   Assessment   Encounter  Diagnoses   Name Primary?    Type 2 diabetes mellitus without complication, without long-term current use of insulin (HCC) Yes    Essential hypertension     Encounter for screening mammogram for malignant neoplasm of breast     Breast cancer screening by mammogram      1. Type 2 diabetes mellitus without complication, without long-term current use of insulin (HCC)    DIABETES A&P    LABORATORY & ORDERS: Blood test(s) ordered today ;   Orders Placed This Encounter   Procedures    POC Glycohemoglobin [51682]    Microalb/Creat Ratio, Random Urine    Comp Metabolic Panel (14)     Additional orders include: CPM  MEDICATIONS:  Current Medications[7].  Requested Prescriptions     Signed Prescriptions Disp Refills    Accu-Chek Softclix Lancets Does not apply Misc 100 each 3     Sig: Use Q day    amLODIPine 5 MG Oral Tab 90 tablet 2     Sig: Take 1 tablet (5 mg total) by mouth daily.    metFORMIN 850 MG Oral Tab 90 tablet 2     Sig: Take 1 tablet (850 mg total) by mouth daily with breakfast.      REFERRALS:       Procedures    POC Glycohemoglobin [[id number]]    Microalb/Creat Ratio, Random Urine    Comp Metabolic Panel (14)    OPHTHALMOLOGY - INTERNAL     RECOMMENDATIONS: instructed in use of glucometer ( check fasting glucose rarely), return for training in administering insulin injections, adherence to an 1800 calorie ADA diet,  5 pound weight loss, a graduated exercise program, HgbA1C level checked quarterly, daily foot self-inspection, need for yearly flu shots, and avoid all sodas, juices, candy, chocolates, cakes, ice cream, etc.      FOLLOW-UP: Schedule a follow-up visit in 6 months.       COUNSELING: The patient was counseled concerning the relationship between diabetes control and macrovascular disease including cardiovascular, cerebrovascular and peripheral vascular disease. The patient was counseled concerning the relationship between diabetes control and retinopathy, nephropathy, and neuropathy. Advised as to the  targets of pre-meal glucoses ( mg/dl) and post meal glucoses (<140-160 mg/dl) Home glucose testing discussed. The A1c target of <7% according to ADA and <6.5% according to AACE were discussed.           2. Essential hypertension    MEDICATIONS: CPM     LABORATORY & ORDERS:   Orders Placed This Encounter   Procedures            Comp Metabolic Panel (14)     RECOMMENDATIONS given include: avoid pseudoephedrine or other stimulants/decongestants in common cold remedies, decrease consumption of alcohol, perform routine monitoring of blood pressure with home blood pressure cuff, exercise, reduction of dietary salt intake, take medication as prescribed, try not to miss doses, smoking cessation, weight loss, and stress reduction.    FOLLOW-UP: Schedule a follow-up visit in 6 months.         3. Encounter for screening mammogram for malignant neoplasm of breast  4. Breast cancer screening by mammogram  Referral:   - [facility] 2D+3D SCREENING BILAT (CPT=77067/25689); Future          Orders This Visit:  Orders Placed This Encounter   Procedures    POC Glycohemoglobin [[id number]]    Microalb/Creat Ratio, Random Urine    Comp Metabolic Panel (14)       Meds This Visit:  Requested Prescriptions      No prescriptions requested or ordered in this encounter       Imaging & Referrals:  OPHTHALMOLOGY - INTERNAL  [facility] 2D+3D SCREENING BILAT (CPT=77067/97279)         SUZANNE CARUSO MD       [1]   Past Medical History:   Quiñones's esophagus    Encounter for PPD test    Gastrointestinal disease    H Pylori   [2]   Past Surgical History:  Procedure Laterality Date    Laparoscopic cholecystectomy  8/14    Other surgical history  2013    EGD   [3] No family history on file.  [4]   Social History  Socioeconomic History    Marital status:    Tobacco Use    Smoking status: Never    Smokeless tobacco: Never   Vaping Use    Vaping status: Never Used   Substance and Sexual Activity    Alcohol use: No     Alcohol/week: 0.0 standard  drinks of alcohol    Drug use: No   [5]   Current Outpatient Medications   Medication Sig Dispense Refill    amLODIPine 5 MG Oral Tab Take 1 tablet (5 mg total) by mouth daily. 90 tablet 2    metFORMIN 850 MG Oral Tab Take 1 tablet (850 mg total) by mouth daily with breakfast. 90 tablet 2    Accu-Chek Softclix Lancets Does not apply Misc Use Q day 100 each 3    Glucose Blood (ACCU-CHEK GUIDE) In Vitro Strip Use Q day 100 strip 3    ibuprofen 600 MG Oral Tab Take 1 tablet (600 mg total) by mouth every 6 (six) hours as needed for Pain. Always take it with food. (Patient not taking: Reported on 12/6/2024) 60 tablet 0   [6] No Known Allergies  [7]   Accu-Chek Softclix Lancets Does not apply Misc, Use Q day, Disp: 100 each, Rfl: 3    amLODIPine 5 MG Oral Tab, Take 1 tablet (5 mg total) by mouth daily., Disp: 90 tablet, Rfl: 2    metFORMIN 850 MG Oral Tab, Take 1 tablet (850 mg total) by mouth daily with breakfast., Disp: 90 tablet, Rfl: 2    Glucose Blood (ACCU-CHEK GUIDE) In Vitro Strip, Use Q day, Disp: 100 strip, Rfl: 3

## 2025-06-23 ENCOUNTER — MED REC SCAN ONLY (OUTPATIENT)
Dept: FAMILY MEDICINE CLINIC | Facility: CLINIC | Age: 45
End: 2025-06-23

## (undated) NOTE — LETTER
December 22, 2018     [address] [facility] [address]      Dear Carter Chairez:    Below are the results from your recent visit:    Resulted Orders   URINALYSIS, AUTO, W/O SCOPE   Result Value Ref Range    GLUCOSE (URINE DIPSTICK) egative

## (undated) NOTE — LETTER
01/13/21    [address] [place] [id number]      Dear Susan Stock,    [facility] records indicate that you have outstanding lab work and or testing that was ordered for you and has not yet been completed:  Orders Placed This Encounter      C

## (undated) NOTE — LETTER
10/12/20    [address]  [facility] [address]      Dear Jr Manzanares,    [facility] records indicate that you have outstanding lab work and or testing that was ordered for you and has not yet been completed:  Orders Placed This Encounter  CARDI